# Patient Record
Sex: FEMALE | Race: WHITE | HISPANIC OR LATINO | Employment: FULL TIME | URBAN - METROPOLITAN AREA
[De-identification: names, ages, dates, MRNs, and addresses within clinical notes are randomized per-mention and may not be internally consistent; named-entity substitution may affect disease eponyms.]

---

## 2017-01-25 ENCOUNTER — EH NON-PROVIDER (OUTPATIENT)
Dept: OCCUPATIONAL MEDICINE | Facility: CLINIC | Age: 57
End: 2017-01-25

## 2017-01-25 ENCOUNTER — EMPLOYEE HEALTH (OUTPATIENT)
Dept: OCCUPATIONAL MEDICINE | Facility: CLINIC | Age: 57
End: 2017-01-25

## 2017-01-25 ENCOUNTER — HOSPITAL ENCOUNTER (OUTPATIENT)
Facility: MEDICAL CENTER | Age: 57
End: 2017-01-25
Attending: PREVENTIVE MEDICINE
Payer: COMMERCIAL

## 2017-01-25 VITALS
DIASTOLIC BLOOD PRESSURE: 90 MMHG | SYSTOLIC BLOOD PRESSURE: 142 MMHG | HEART RATE: 85 BPM | WEIGHT: 175 LBS | OXYGEN SATURATION: 94 % | BODY MASS INDEX: 32.2 KG/M2 | RESPIRATION RATE: 14 BRPM | TEMPERATURE: 98.1 F | HEIGHT: 62 IN

## 2017-01-25 DIAGNOSIS — Z02.1 ENCOUNTER FOR PRE-EMPLOYMENT HEALTH SCREENING EXAMINATION: ICD-10-CM

## 2017-01-25 DIAGNOSIS — Z02.1 PRE-EMPLOYMENT DRUG SCREENING: ICD-10-CM

## 2017-01-25 DIAGNOSIS — Z02.1 PHYSICAL EXAM, PRE-EMPLOYMENT: ICD-10-CM

## 2017-01-25 LAB
AMP AMPHETAMINE: NORMAL
BAR BARBITURATES: NORMAL
BZO BENZODIAZEPINES: NORMAL
COC COCAINE: NORMAL
HBV CORE AB SERPL QL IA: NEGATIVE
HBV SURFACE AB SER RIA-ACNC: 280.07 MIU/ML (ref 0–10)
HBV SURFACE AG SERPL QL IA: NEGATIVE
INT CON NEG: NORMAL
INT CON POS: NORMAL
MDMA ECSTASY: NORMAL
MET METHAMPHETAMINES: NORMAL
MTD METHADONE: NORMAL
OPI OPIATES: NORMAL
OXY OXYCODONE: NORMAL
PCP PHENCYCLIDINE: NORMAL
POC URINE DRUG SCREEN OCDRS: NEGATIVE
RUBV IGG SERPL IA-ACNC: 44.8 IU/ML
THC: NORMAL

## 2017-01-25 PROCEDURE — 94375 RESPIRATORY FLOW VOLUME LOOP: CPT | Performed by: PREVENTIVE MEDICINE

## 2017-01-25 PROCEDURE — 86735 MUMPS ANTIBODY: CPT | Performed by: PREVENTIVE MEDICINE

## 2017-01-25 PROCEDURE — 86762 RUBELLA ANTIBODY: CPT | Performed by: PREVENTIVE MEDICINE

## 2017-01-25 PROCEDURE — 86704 HEP B CORE ANTIBODY TOTAL: CPT | Performed by: PREVENTIVE MEDICINE

## 2017-01-25 PROCEDURE — 87340 HEPATITIS B SURFACE AG IA: CPT | Performed by: PREVENTIVE MEDICINE

## 2017-01-25 PROCEDURE — 99204 OFFICE O/P NEW MOD 45 MIN: CPT | Performed by: PREVENTIVE MEDICINE

## 2017-01-25 PROCEDURE — 80305 DRUG TEST PRSMV DIR OPT OBS: CPT | Performed by: PREVENTIVE MEDICINE

## 2017-01-25 PROCEDURE — 86765 RUBEOLA ANTIBODY: CPT | Performed by: PREVENTIVE MEDICINE

## 2017-01-25 PROCEDURE — 86787 VARICELLA-ZOSTER ANTIBODY: CPT | Performed by: PREVENTIVE MEDICINE

## 2017-01-25 PROCEDURE — 86480 TB TEST CELL IMMUN MEASURE: CPT | Performed by: PREVENTIVE MEDICINE

## 2017-01-25 PROCEDURE — 86706 HEP B SURFACE ANTIBODY: CPT | Performed by: PREVENTIVE MEDICINE

## 2017-01-25 NOTE — MR AVS SNAPSHOT
"        Tammy Ontiveros   2017 1:40 PM    Non-Provider   MRN: [id number]    Department:  [facility]   Dept Phone:  [phone]    Description:  Female : 1960   Provider:  [facility] BRENNEN MULLEN RNIK           Reason for Visit     Other [facility] physical      Allergies as of 2017     No Known Allergies      You were diagnosed with     Encounter for pre-employment health screening examination   [193519]       Pre-employment drug screening   [912392]         Vital Signs     Blood Pressure Pulse Temperature Respirations Height Weight    142/90 mmHg 85 36.7 °C (98.1 °F) 14 1.575 m (5' 2\") 79.379 kg (175 lb)    Body Mass Index Oxygen Saturation                32.00 kg/m2 94%          Basic Information     Date Of Birth Sex Race Ethnicity Preferred Language    1960 Female  or   Origin (Portuguese,Wallisian,Luxembourger,Swazi, etc) English      Health Maintenance     Patient has no pending health maintenance at this time      Results     POCT 11 Panel Urine Drug Screen      Component    AMPHETAMINE    COCAINE    POC THC    METHAMPHETAMINES    OPIATES    PHENCYCLIDINE    BENZODIAZIPINES    BARBITURATES    METHADONE    MDMA Ecstasy    OXYCODONE    Urine Drug Screen    NEGATIVE    Internal Control Positive    Valid    Internal Control Negative    Valid                        Current Immunizations     No immunizations on file.      Below and/or attached are the medications your provider expects you to take. Review all of your home medications and newly ordered medications with your provider and/or pharmacist. Follow medication instructions as directed by your provider and/or pharmacist. Please keep your medication list with you and share with your provider. Update the information when medications are discontinued, doses are changed, or new medications (including over-the-counter products) are added; and carry medication information at all times in the event of emergency situations "     Allergies:  No Known Allergies          Medications  Valid as of: January 25, 2017 -  4:30 PM    Generic Name Brand Name Tablet Size Instructions for use    .                 Medicines prescribed today were sent to:     None      Medication refill instructions:       If your prescription bottle indicates you have medication refills left, it is not necessary to call your provider’s office. Please contact your pharmacy and they will refill your medication.    If your prescription bottle indicates you do not have any refills left, you may request refills at any time through one of the following ways: The online Flatora system (except Urgent Care), by calling your provider’s office, or by asking your pharmacy to contact your provider’s office with a refill request. Medication refills are processed only during regular business hours and may not be available until the next business day. Your provider may request additional information or to have a follow-up visit with you prior to refilling your medication.   *Please Note: Medication refills are assigned a new Rx number when refilled electronically. Your pharmacy may indicate that no refills were authorized even though a new prescription for the same medication is available at the pharmacy. Please request the medicine by name with the pharmacy before contacting your provider for a refill.        Your To Do List     Future Labs/Procedures Complete By Expires    HEP B CORE AB TOTAL  As directed 1/25/2018    HEP B SURFACE AB  As directed 1/25/2018    HEP B SURFACE ANTIGEN  As directed 1/25/2018    MUMPS AB IGG  As directed 1/25/2018    QuantiFERON-TB Gold [TB TEST CELL IMM MEASURE AG]  As directed 1/25/2018    RUBELLA ABS IGG  As directed 1/25/2018    RUBEOLA AB IGM  As directed 1/25/2018    VARICELLA ZOSTER IGG AB  As directed 1/25/2018         Flatora Access Code: [email or web address]  Expires: 2/24/2017  4:30 PM    Your email address is not on file at [facility].   Email Addresses are required for you to sign up for LeadiD, please contact [phone] to verify your personal information and to provide your email address prior to attempting to register for LeadiD.    Tammy Ontiveros  [address] [place] [address]    [employer] Angell[employer] Network[employer]  A secure, online tool to manage your health information     [employer]’s LeadiD® is a secure, online tool that connects you to your personalized health information from the privacy of your home -- day or night - making it very easy for you to manage your healthcare. Once the activation process is completed, you can even access your medical information using the LeadiD deborah, which is available for free in the Apple Deborah store or Google Play store.     To learn more about LeadiD, visit [email or web address]    There are two levels of access available (as shown below):   My Chart Features  [facility] Doctor [facility]  Specialists [facility]  [facility]  Care [facility]   Email your healthcare team securely and privately 24/7 X X X    Manage appointments: schedule your next appointment; view details of past/upcoming appointments X      Request prescription refills. X      View recent personal medical records, including lab and immunizations X X X X   View health record, including health history, allergies, medications X X X X   Read reports about your outpatient visits, procedures, consult and ER notes X X X X   See your discharge summary, which is a recap of your hospital and/or ER visit that includes your diagnosis, lab results, and care plan X X  X     How to register for TeleCIS Wirelesst:  Once your e-mail address has been verified, follow the following steps to sign up for LeadiD.     1. Go to  [email or web address]  2. Click on the Sign Up Now box, which takes you to the New Member Sign Up page. You will need to provide the following information:  a. Enter your LeadiD Access Code exactly as it appears at the top of this page. (You  will not need to use this code after you’ve completed the sign-up process. If you do not sign up before the expiration date, you must request a new code.)   b. Enter your date of birth.   c. Enter your home email address.   d. Click Submit, and follow the next screen’s instructions.  3. Create a [employer] ID. This will be your [employer] login ID and cannot be changed, so think of one that is secure and easy to remember.  4. Create a [employer] password. You can change your password at any time.  5. Enter your Password Reset Question and Answer. This can be used at a later time if you forget your password.   6. Enter your e-mail address. This allows you to receive e-mail notifications when new information is available in [employer].  7. Click Sign Up. You can now view your health information.    For assistance activating your [employer] account, call [phone]

## 2017-01-25 NOTE — MR AVS SNAPSHOT
Tammy Ontiveros   2017 2:20 PM   Employee Health   MRN: [id number]    Department:  [facility]   Dept Phone:  [phone]    Description:  Female : 1960   Provider:  Iogr Toro D.O.           Reason for Visit     Other [facility] physical      Allergies as of 2017     No Known Allergies      You were diagnosed with     Physical exam, pre-employment   [865366]         Basic Information     Date Of Birth Sex Race Ethnicity Preferred Language    1960 Female  or   Origin (Macedonian,Angolan,Gambian,Cali, etc) English      Health Maintenance     Patient has no pending health maintenance at this time      Current Immunizations     No immunizations on file.      Below and/or attached are the medications your provider expects you to take. Review all of your home medications and newly ordered medications with your provider and/or pharmacist. Follow medication instructions as directed by your provider and/or pharmacist. Please keep your medication list with you and share with your provider. Update the information when medications are discontinued, doses are changed, or new medications (including over-the-counter products) are added; and carry medication information at all times in the event of emergency situations     Allergies:  No Known Allergies          Medications  Valid as of: 2017 -  4:31 PM    Generic Name Brand Name Tablet Size Instructions for use    .                 Medicines prescribed today were sent to:     None      Medication refill instructions:       If your prescription bottle indicates you have medication refills left, it is not necessary to call your provider’s office. Please contact your pharmacy and they will refill your medication.    If your prescription bottle indicates you do not have any refills left, you may request refills at any time through one of the following ways: The online Cardinal Midstream system (except Urgent Care), by  calling your provider’s office, or by asking your pharmacy to contact your provider’s office with a refill request. Medication refills are processed only during regular business hours and may not be available until the next business day. Your provider may request additional information or to have a follow-up visit with you prior to refilling your medication.   *Please Note: Medication refills are assigned a new Rx number when refilled electronically. Your pharmacy may indicate that no refills were authorized even though a new prescription for the same medication is available at the pharmacy. Please request the medicine by name with the pharmacy before contacting your provider for a refill.           PeerTrader Access Code: [email or web address]  Expires: 2/24/2017  4:30 PM    Your email address is not on file at [facility].  Email Addresses are required for you to sign up for PeerTrader, please contact [phone] to verify your personal information and to provide your email address prior to attempting to register for PeerTrader.    Tammy Ontiveros  [address] [place] [address]    PeerTrader  A secure, online tool to manage your health information     [facility]’s PeerTrader® is a secure, online tool that connects you to your personalized health information from the privacy of your home -- day or night - making it very easy for you to manage your healthcare. Once the activation process is completed, you can even access your medical information using the PeerTrader deborah, which is available for free in the Apple Deborah store or Google Play store.     To learn more about PeerTrader, visit [email or web address]    There are two levels of access available (as shown below):   My Chart Features  [facility] Primary Care Doctor [facility]  Specialists [facility]  Urgent  Care Non-[facility] Primary Care Doctor   Email your healthcare team securely and privately 24/7 X X X    Manage appointments: schedule your next appointment; view details of past/upcoming  appointments X      Request prescription refills. X      View recent personal medical records, including lab and immunizations X X X X   View health record, including health history, allergies, medications X X X X   Read reports about your outpatient visits, procedures, consult and ER notes X X X X   See your discharge summary, which is a recap of your hospital and/or ER visit that includes your diagnosis, lab results, and care plan X X  X     How to register for Syntec Biofuel:  Once your e-mail address has been verified, follow the following steps to sign up for Syntec Biofuel.     1. Go to  [email or web address]  2. Click on the Sign Up Now box, which takes you to the New Member Sign Up page. You will need to provide the following information:  a. Enter your Syntec [employer]fuel Access Code exactly as it appears at the top of this page. (You will not need to use this code after you’ve completed the sign-up process. If you do not sign up before the expiration date, you must request a new code.)   b. Enter your date of birth.   c. Enter your home email address.   d. Click Submit, and follow the next screen’s instructions.  3. Create a Syntec Biofuel ID. This will be your Syntec Biofuel login ID and cannot be changed, so think of one that is secure and easy to remember.  4. Create a Syntec Biofuel password. You can change your password at any time.  5. Enter your Password Reset Question and Answer. This can be used at a later time if you forget your password.   6. Enter your e-mail address. This allows you to receive e-mail notifications when new information is available in Syntec Biofuel.  7. Click Sign Up. You can now view your health information.    For assistance activating your Syntec Biofuel account, call [phone]

## 2017-01-27 LAB
M TB TUBERC IFN-G/MITOGEN IGNF BLD: -0.02
M TB TUBERC IGNF/MITOGEN IGNF CONTROL: 62.19 [IU]/ML
MEV IGG SER IA-ACNC: >300 AU/ML
MITOGEN IGNF BCKGRD COR BLD-ACNC: 0.14 [IU]/ML
MUV IGG SER IA-ACNC: 217 AU/ML
QUANT TB GOLD 86480: NEGATIVE
VZV IGG SER IA-ACNC: 288 IV

## 2017-07-26 ENCOUNTER — HOSPITAL ENCOUNTER (OUTPATIENT)
Dept: LAB | Facility: MEDICAL CENTER | Age: 57
End: 2017-07-26
Payer: COMMERCIAL

## 2017-07-26 LAB
BDY FAT % MEASURED: 39.8 %
BP DIAS: 69 MMHG
BP SYS: 129 MMHG
CHOLEST SERPL-MCNC: 177 MG/DL (ref 100–199)
DIABETES HTDIA: NO
EVENT NAME HTEVT: NORMAL
FASTING HTFAS: YES
GLUCOSE SERPL-MCNC: 96 MG/DL (ref 65–99)
HDLC SERPL-MCNC: 38 MG/DL
HIP CIRCUMFERENCE HTHC: ABNORMAL IN
HYPERTENSION HTHYP: NO
LDLC SERPL CALC-MCNC: 102 MG/DL
SCREENING LOC CITY HTCIT: NORMAL
SCREENING LOC STATE HTSTA: NORMAL
SCREENING LOCATION HTLOC: NORMAL
SMOKING HTSMO: NO
SUBSCRIBER ID HTSID: NORMAL
TRIGL SERPL-MCNC: 184 MG/DL (ref 0–149)
WAIST CIRCUMFERENCE HTWC: ABNORMAL IN

## 2017-07-26 PROCEDURE — 36415 COLL VENOUS BLD VENIPUNCTURE: CPT

## 2017-07-26 PROCEDURE — 82947 ASSAY GLUCOSE BLOOD QUANT: CPT

## 2017-07-26 PROCEDURE — S5190 WELLNESS ASSESSMENT BY NONPH: HCPCS

## 2017-07-26 PROCEDURE — 80061 LIPID PANEL: CPT

## 2017-08-09 ENCOUNTER — HOSPITAL ENCOUNTER (OUTPATIENT)
Dept: RADIOLOGY | Facility: MEDICAL CENTER | Age: 57
End: 2017-08-09
Attending: FAMILY MEDICINE
Payer: COMMERCIAL

## 2017-08-09 DIAGNOSIS — Z12.31 SCREENING MAMMOGRAM, ENCOUNTER FOR: ICD-10-CM

## 2017-08-09 PROCEDURE — 77063 BREAST TOMOSYNTHESIS BI: CPT

## 2017-09-11 ENCOUNTER — NON-PROVIDER VISIT (OUTPATIENT)
Dept: OCCUPATIONAL MEDICINE | Facility: CLINIC | Age: 57
End: 2017-09-11

## 2017-09-11 DIAGNOSIS — Z29.89 NEED FOR ISOLATION: ICD-10-CM

## 2017-09-11 PROCEDURE — 94375 RESPIRATORY FLOW VOLUME LOOP: CPT

## 2017-10-03 ENCOUNTER — IMMUNIZATION (OUTPATIENT)
Dept: OCCUPATIONAL MEDICINE | Facility: CLINIC | Age: 57
End: 2017-10-03

## 2017-10-03 DIAGNOSIS — Z23 NEED FOR VACCINATION: ICD-10-CM

## 2017-10-03 PROCEDURE — 90686 IIV4 VACC NO PRSV 0.5 ML IM: CPT | Performed by: PREVENTIVE MEDICINE

## 2017-10-11 ENCOUNTER — HOSPITAL ENCOUNTER (OUTPATIENT)
Dept: LAB | Facility: MEDICAL CENTER | Age: 57
End: 2017-10-11
Attending: STUDENT IN AN ORGANIZED HEALTH CARE EDUCATION/TRAINING PROGRAM
Payer: COMMERCIAL

## 2017-10-11 LAB
25(OH)D3 SERPL-MCNC: 18 NG/ML (ref 30–100)
ALBUMIN SERPL BCP-MCNC: 4 G/DL (ref 3.2–4.9)
ALBUMIN/GLOB SERPL: 1.1 G/DL
ALP SERPL-CCNC: 88 U/L (ref 30–99)
ALT SERPL-CCNC: 14 U/L (ref 2–50)
ANION GAP SERPL CALC-SCNC: 9 MMOL/L (ref 0–11.9)
AST SERPL-CCNC: 18 U/L (ref 12–45)
BILIRUB SERPL-MCNC: 0.6 MG/DL (ref 0.1–1.5)
BUN SERPL-MCNC: 13 MG/DL (ref 8–22)
CALCIUM SERPL-MCNC: 9.4 MG/DL (ref 8.5–10.5)
CHLORIDE SERPL-SCNC: 103 MMOL/L (ref 96–112)
CHOLEST SERPL-MCNC: 174 MG/DL (ref 100–199)
CO2 SERPL-SCNC: 26 MMOL/L (ref 20–33)
CREAT SERPL-MCNC: 0.58 MG/DL (ref 0.5–1.4)
GFR SERPL CREATININE-BSD FRML MDRD: >60 ML/MIN/1.73 M 2
GLOBULIN SER CALC-MCNC: 3.7 G/DL (ref 1.9–3.5)
GLUCOSE SERPL-MCNC: 86 MG/DL (ref 65–99)
HDLC SERPL-MCNC: 32 MG/DL
LDLC SERPL CALC-MCNC: 78 MG/DL
POTASSIUM SERPL-SCNC: 4.2 MMOL/L (ref 3.6–5.5)
PROT SERPL-MCNC: 7.7 G/DL (ref 6–8.2)
SODIUM SERPL-SCNC: 138 MMOL/L (ref 135–145)
TRIGL SERPL-MCNC: 318 MG/DL (ref 0–149)
TSH SERPL DL<=0.005 MIU/L-ACNC: 1.1 UIU/ML (ref 0.3–3.7)

## 2017-10-11 PROCEDURE — 83036 HEMOGLOBIN GLYCOSYLATED A1C: CPT

## 2017-10-11 PROCEDURE — 84443 ASSAY THYROID STIM HORMONE: CPT

## 2017-10-11 PROCEDURE — 80053 COMPREHEN METABOLIC PANEL: CPT

## 2017-10-11 PROCEDURE — 80061 LIPID PANEL: CPT

## 2017-10-11 PROCEDURE — 82306 VITAMIN D 25 HYDROXY: CPT

## 2017-10-11 PROCEDURE — 85025 COMPLETE CBC W/AUTO DIFF WBC: CPT

## 2017-10-11 PROCEDURE — 36415 COLL VENOUS BLD VENIPUNCTURE: CPT

## 2017-10-12 LAB
BASOPHILS # BLD AUTO: 1.6 % (ref 0–1.8)
BASOPHILS # BLD: 0.12 K/UL (ref 0–0.12)
EOSINOPHIL # BLD AUTO: 0.24 K/UL (ref 0–0.51)
EOSINOPHIL NFR BLD: 3.2 % (ref 0–6.9)
ERYTHROCYTE [DISTWIDTH] IN BLOOD BY AUTOMATED COUNT: 41.8 FL (ref 35.9–50)
EST. AVERAGE GLUCOSE BLD GHB EST-MCNC: 137 MG/DL
HBA1C MFR BLD: 6.4 % (ref 0–5.6)
HCT VFR BLD AUTO: 46.5 % (ref 37–47)
HGB BLD-MCNC: 15.5 G/DL (ref 12–16)
IMM GRANULOCYTES # BLD AUTO: 0.02 K/UL (ref 0–0.11)
IMM GRANULOCYTES NFR BLD AUTO: 0.3 % (ref 0–0.9)
LYMPHOCYTES # BLD AUTO: 2.35 K/UL (ref 1–4.8)
LYMPHOCYTES NFR BLD: 31.8 % (ref 22–41)
MCH RBC QN AUTO: 28.8 PG (ref 27–33)
MCHC RBC AUTO-ENTMCNC: 33.3 G/DL (ref 33.6–35)
MCV RBC AUTO: 86.3 FL (ref 81.4–97.8)
MONOCYTES # BLD AUTO: 0.54 K/UL (ref 0–0.85)
MONOCYTES NFR BLD AUTO: 7.3 % (ref 0–13.4)
NEUTROPHILS # BLD AUTO: 4.13 K/UL (ref 2–7.15)
NEUTROPHILS NFR BLD: 55.8 % (ref 44–72)
NRBC # BLD AUTO: 0 K/UL
NRBC BLD AUTO-RTO: 0 /100 WBC
PLATELET # BLD AUTO: 364 K/UL (ref 164–446)
PMV BLD AUTO: 10 FL (ref 9–12.9)
RBC # BLD AUTO: 5.39 M/UL (ref 4.2–5.4)
WBC # BLD AUTO: 7.4 K/UL (ref 4.8–10.8)

## 2017-12-27 ENCOUNTER — HOSPITAL ENCOUNTER (OUTPATIENT)
Dept: LAB | Facility: MEDICAL CENTER | Age: 57
End: 2017-12-27
Attending: FAMILY MEDICINE
Payer: COMMERCIAL

## 2017-12-27 LAB
25(OH)D3 SERPL-MCNC: 64 NG/ML (ref 30–100)
CHOLEST SERPL-MCNC: 192 MG/DL (ref 100–199)
EST. AVERAGE GLUCOSE BLD GHB EST-MCNC: 123 MG/DL
HBA1C MFR BLD: 5.9 % (ref 0–5.6)
HDLC SERPL-MCNC: 41 MG/DL
LDLC SERPL CALC-MCNC: 122 MG/DL
TRIGL SERPL-MCNC: 143 MG/DL (ref 0–149)

## 2017-12-27 PROCEDURE — 36415 COLL VENOUS BLD VENIPUNCTURE: CPT

## 2017-12-27 PROCEDURE — 82306 VITAMIN D 25 HYDROXY: CPT

## 2017-12-27 PROCEDURE — 80061 LIPID PANEL: CPT

## 2017-12-27 PROCEDURE — 83036 HEMOGLOBIN GLYCOSYLATED A1C: CPT

## 2018-01-30 ENCOUNTER — APPOINTMENT (OUTPATIENT)
Dept: RADIOLOGY | Facility: IMAGING CENTER | Age: 58
End: 2018-01-30
Attending: PREVENTIVE MEDICINE
Payer: COMMERCIAL

## 2018-01-30 ENCOUNTER — OCCUPATIONAL MEDICINE (OUTPATIENT)
Dept: OCCUPATIONAL MEDICINE | Facility: CLINIC | Age: 58
End: 2018-01-30
Payer: COMMERCIAL

## 2018-01-30 VITALS
SYSTOLIC BLOOD PRESSURE: 120 MMHG | RESPIRATION RATE: 16 BRPM | WEIGHT: 165 LBS | DIASTOLIC BLOOD PRESSURE: 80 MMHG | OXYGEN SATURATION: 98 % | BODY MASS INDEX: 30.36 KG/M2 | TEMPERATURE: 97 F | HEIGHT: 62 IN | HEART RATE: 70 BPM

## 2018-01-30 DIAGNOSIS — Z02.1 PRE-EMPLOYMENT DRUG SCREENING: ICD-10-CM

## 2018-01-30 DIAGNOSIS — S80.01XA CONTUSION OF RIGHT KNEE, INITIAL ENCOUNTER: ICD-10-CM

## 2018-01-30 DIAGNOSIS — S60.229A CONTUSION OF HAND, UNSPECIFIED LATERALITY, INITIAL ENCOUNTER: ICD-10-CM

## 2018-01-30 DIAGNOSIS — S62.656A: ICD-10-CM

## 2018-01-30 DIAGNOSIS — S80.211A ABRASION OF RIGHT KNEE, INITIAL ENCOUNTER: Primary | ICD-10-CM

## 2018-01-30 DIAGNOSIS — S80.211A ABRASION OF RIGHT KNEE, INITIAL ENCOUNTER: ICD-10-CM

## 2018-01-30 DIAGNOSIS — S62.654A: ICD-10-CM

## 2018-01-30 PROCEDURE — 73562 X-RAY EXAM OF KNEE 3: CPT | Mod: TC,RT | Performed by: EMERGENCY MEDICINE

## 2018-01-30 PROCEDURE — 73130 X-RAY EXAM OF HAND: CPT | Mod: TC,LT | Performed by: EMERGENCY MEDICINE

## 2018-01-30 PROCEDURE — 73130 X-RAY EXAM OF HAND: CPT | Mod: 26,RT | Performed by: EMERGENCY MEDICINE

## 2018-01-30 PROCEDURE — 73110 X-RAY EXAM OF WRIST: CPT | Mod: TC,LT | Performed by: EMERGENCY MEDICINE

## 2018-01-30 PROCEDURE — 99203 OFFICE O/P NEW LOW 30 MIN: CPT | Performed by: PREVENTIVE MEDICINE

## 2018-01-30 RX ORDER — IBUPROFEN 200 MG
400 TABLET ORAL ONCE
OUTPATIENT
Start: 2018-01-30 | End: 2018-01-31

## 2018-01-30 RX ORDER — DICLOFENAC SODIUM 75 MG/1
75 TABLET, DELAYED RELEASE ORAL 2 TIMES DAILY
Qty: 60 TAB | Refills: 1 | Status: SHIPPED | OUTPATIENT
Start: 2018-01-30

## 2018-01-30 RX ORDER — ACETAMINOPHEN 500 MG
1000 TABLET ORAL ONCE
OUTPATIENT
Start: 2018-01-30 | End: 2018-01-31

## 2018-01-30 ASSESSMENT — PAIN SCALES - GENERAL: PAINLEVEL: 3=SLIGHT PAIN

## 2018-01-30 NOTE — LETTER
"EMPLOYEE’S CLAIM FOR COMPENSATION/ REPORT OF INITIAL TREATMENT  FORM C-4    EMPLOYEE’S CLAIM - PROVIDE ALL INFORMATION REQUESTED   First Name  Tammy Last Name  Weston Birthdate                    1960                Sex  female Claim Number   Home Address  [address] Age  57 y.o. 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 (if applicable)  walking    How did this injury or occupational disease occur? (Be specific an answer in detail. Use additional sheet if necessary)  I fell down as I was walking towads the entrance of [facility]. I somehow I fell and scraped my knee and hands   If you believe that you have an occupational disease, when did you first have knowledge of the disability and it relationship to your employment?  n/a Witnesses to the Accident  Yes. I'm not sure the name      Nature of Injury or Occupational Disease  Contusion  Part(s) of Body Injured or Affected  Knee (R), Hand (L), Hand (R)    I certify that the above is true and correct to the best of my knowledge and that I have provided this information in order to obtain the benefits of " [place]).  I hereby authorize any physician, chiropractor, surgeon, practitioner, or other person, any hospital, including [facility] or [facility], any medical service organization, any insurance company, or other institution or organization to release to each other, any medical or other information, including benefits paid or payable, pertinent to this injury or disease, except information relative to diagnosis, treatment and/or counseling for AIDS, psychological conditions, alcohol or controlled substances, for which I must give specific authorization.  A Photostat of this authorization shall be as valid as the original.     Date   Place   Employee’s Signature   THIS REPORT MUST BE COMPLETED AND MAILED WITHIN 3 WORKING DAYS OF TREATMENT   Place  [facility]  [facility]   Date  1/30/2018 Diagnosis  (S80.211A) Abrasion of right knee, initial encounter  (S80.01XA) Contusion of right knee, initial encounter  (S60.229A) Contusion of hand, unspecified laterality, initial encounter Is there evidence the injured employee was under the influence of alcohol and/or another controlled substance at the time of accident?   Hour  9:15 AM Description of Injury or Disease  Diagnoses of Abrasion of right knee, initial encounter, Contusion of right knee, initial encounter, and Contusion of hand, unspecified laterality, initial encounter were pertinent to this visit. No   Treatment  Right knee abrasion, right hand contusion-first aid only  Have you advised the patient to remain off work five days or more? No   X-Ray Findings      If Yes   From Date  To Date      From information given by the employee, together with medical evidence, can you directly connect this injury or occupational disease as job incurred?  Yes If No Full Duty  Yes Modified Duty      Is additional  "medical care by a physician indicated?  Yes If Modified Duty, Specify any Limitations / Restrictions      Do you know of any previous injury or disease contributing to this condition or occupational disease?                            No   Date  1/30/2018 Print Doctor’s Name Herbert Calero M.D. I certify the employer’s copy of  this form was mailed on:   Address  [address] [place] [address],   [address] Insurer’s Use Only     [facility]  [address]    Provider’s Tax ID Number  [id number] Telephone  Dept: [phone]        e-HERBERT Ibanez M.D.   e-Signature: Dr. Rob Akbar, Medical Director Degree  MD        ORIGINAL-TREATING PHYSICIAN OR CHIROPRACTOR    PAGE 2-INSURER/TPA    PAGE 3-EMPLOYER    PAGE 4-EMPLOYEE             Form C-4 (rev10/07)              BRIEF DESCRIPTION OF RIGHTS AND BENEFITS  (Pursuant to [place]RS 616C.050)    Notice of Injury or Occupational Disease (Incident Report Form C-1): If an injury or occupational disease (OD) arises out of and in the  course of employment, you must provide written notice to your employer as soon as practicable, but no later than 7 days after the accident or  OD. Your employer shall maintain a sufficient supply of the required forms.    Claim for Compensation (Form C-4): If medical treatment is sought, the form C-4 is available at the place of initial treatment. A completed  \"Claim for Compensation\" (Form C-4) must be filed within 90 days after an accident or OD. The treating physician or chiropractor must,  within 3 working days after treatment, complete and mail to the employer, the employer's insurer and third-party , the Claim for  Compensation.    Medical Treatment: If you require medical treatment for your on-the-job injury or OD, you may be required to select a physician or  chiropractor from a list provided by your workers’ compensation insurer, if it has contracted with an Organization for Managed Care (MCO) or  Preferred Provider " Organization (PPO) or providers of health care. If your employer has not entered into a contract with an MCO or PPO, you  may select a physician or chiropractor from the Panel of Physicians and Chiropractors. Any medical costs related to your industrial injury or  OD will be paid by your insurer.    Temporary Total Disability (TTD): If your doctor has certified that you are unable to work for a period of at least 5 consecutive days, or 5  cumulative days in a 20-day period, or places restrictions on you that your employer does not accommodate, you may be entitled to TTD  compensation.    Temporary Partial Disability (TPD): If the wage you receive upon reemployment is less than the compensation for TTD to which you are  entitled, the insurer may be required to pay you TPD compensation to make up the difference. TPD can only be paid for a maximum of 24  months.    Permanent Partial Disability (PPD): When your medical condition is stable and there is an indication of a PPD as a result of your injury or  OD, within 30 days, your insurer must arrange for an evaluation by a rating physician or chiropractor to determine the degree of your PPD. The  amount of your PPD award depends on the date of injury, the results of the PPD evaluation and your age and wage.    Permanent Total Disability (PTD): If you are medically certified by a treating physician or chiropractor as permanently and totally disabled  and have been granted a PTD status by your insurer, you are entitled to receive monthly benefits not to exceed 66 2/3% of your average  monthly wage. The amount of your PTD payments is subject to reduction if you previously received a PPD award.    Vocational Rehabilitation Services: You may be eligible for vocational rehabilitation services if you are unable to return to the job due to a  permanent physical impairment or permanent restrictions as a result of your injury or occupational disease.    Transportation and Per Daisy  Reimbursement: You may be eligible for travel expenses and per britany associated with medical treatment.    Reopening: You may be able to reopen your claim if your condition worsens after claim closure.    Appeal Process: If you disagree with a written determination issued by the insurer or the insurer does not respond to your request, you may  appeal to the Department of Administration, , by following the instructions contained in your determination letter. You must  appeal the determination within 70 days from the date of the determination letter at [address], [address], [place], [place]  [address], or [address], [address], [place], [place] [address]. If you disagree with the  decision, you may appeal to the  Department of Administration, . You must file your appeal within 30 days from the date of the  decision  letter at [address], [address], [place], [place] [address], or [address], [facility] [address], [place], [place] [address]. If you  disagree with a decision of an , you may file a petition for judicial review with the District Court. You must do so within 30  days of the Appeal Officer’s decision. You may be represented by an  at your own expense or you may contact the [facility] for possible  representation.    [place]  for Injured Workers (NAIW): If you disagree with a  decision, you may request that NAIW represent you  without charge at an  Hearing. For information regarding denial of benefits, you may contact the [facility] at: [address], [address], [place] [address], [phone], or [address], [facility] [address], [place] [address], [phone]    To File a Complaint with the Division: If you wish to file a complaint with the  of the Division of Industrial Relations (DIR),  please contact the Workers’ Compensation Section, [address], [address], [place], [place] [address], telephone ([phone]) [phone], or  [address], [address], [place], [place] [address], telephone [phone].    For assistance with Workers’ Compensation Issues: you may contact the Office of the [facility] Consumer Health Assistance, [address] [place] [address], [address], [place], [place] [address], Toll Free 1-700.539.5785, Web site: http://govcha.[facility].nv., E-mail  [email or web address].                                                                                                                                                                                                                                   __________________________________________________________________                                                                   _________________                Employee Name / Signature                                                                                                                                                       Date                                                                                                                                                                                                     D-2 (rev. 10/07)

## 2018-01-30 NOTE — LETTER
"EMPLOYEE’S CLAIM FOR COMPENSATION/ REPORT OF INITIAL TREATMENT  FORM C-4    EMPLOYEE’S CLAIM - PROVIDE ALL INFORMATION REQUESTED   First Name  Tammy Last Name  Weston Birthdate                    1960                Sex  female Claim Number   Home Address  [address] Age  57 y.o. Height  1.575 m (5' 2\") Weight  74.8 kg (165 lb) [facility]     [facility] [address] Zip  [address] Telephone  [phone] (home)    Mailing Address  [address] [facility] [address] Zip  [address] Primary Language Spoken  English    Insurer   Third Party   Workers Choice   Employee's Occupation (Job Title) When Injury or Occupational Disease Occurred  [employer]    Employer's Name  [employer]  Telephone  [phone]    Employer Address  [address]  Zip  [address]    Date of Injury  1/30/2018               Hour of Injury  5:55 PM Date Employer Notified  1/30/2018 Last Day of Work after Injury or Occupational Disease  1/30/2018 Supervisor to Whom Injury Reported  Drake Steve   Address or Location of Accident (if applicable)  [facility]   What were you doing at the time of accident? (if applicable)  walking    How did this injury or occupational disease occur? (Be specific an answer in detail. Use additional sheet if necessary)  I fell down as I was walking towads the entrance of [facility]. I somehow I fell and scraped my knee and hands   If you believe that you have an occupational disease, when did you first have knowledge of the disability and it relationship to your employment?  n/a Witnesses to the Accident  Yes. I'm not sure the name      Nature of Injury or Occupational Disease  Contusion  Part(s) of Body Injured or Affected  Knee (R), Hand (L), Hand (R)    I certify that the above is true and correct to the best of my knowledge and that I have provided this information in order to obtain the benefits of [place]’s " [place]  I hereby authorize any physician, chiropractor, surgeon, practitioner, or other person, any hospital, including [facility] or [facility], any medical service organization, any insurance company, or other institution or organization to release to each other, any medical or other information, including benefits paid or payable, pertinent to this injury or disease, except information relative to diagnosis, treatment and/or counseling for AIDS, psychological conditions, alcohol or controlled substances, for which I must give specific authorization.  A Photostat of this authorization shall be as valid as the original.     Date   Place   Employee’s Signature   THIS REPORT MUST BE COMPLETED AND MAILED WITHIN 3 WORKING DAYS OF TREATMENT   Place  [facility]  Name of [facility]   Date  1/30/2018 Diagnosis  (S80.211A) Abrasion of right knee, initial encounter  (primary encounter diagnosis)  (S80.01XA) Contusion of right knee, initial encounter  (S60.229A) Contusion of hand, unspecified laterality, initial encounter  (Z02.1) Pre-employment drug screening  (S62.656A) Nondisplaced fracture of medial phalanx of right little finger, initial encounter for closed fracture  (S62.654A) Nondisplaced fracture of medial phalanx of right ring finger, initial encounter for closed fracture Is there evidence the injured employee was under the influence of alcohol and/or another controlled substance at the time of accident?   Hour  9:15 AM Description of Injury or Disease  The primary encounter diagnosis was Abrasion of right knee, initial encounter. Diagnoses of Contusion of right knee, initial encounter, Contusion of hand, unspecified laterality, initial encounter, Pre-employment drug screening, Nondisplaced fracture of medial phalanx of right little finger, initial encounter for  closed fracture, and Nondisplaced fracture of medial phalanx of right ring finger, initial encounter for closed fracture were also pertinent to this visit. No   Treatment  Right knee abrasion, right hand fracture middle phalanx fourth and fifth digits, left hand contusion  Have you advised the patient to remain off work five days or more? No   X-Ray Findings  Positive   If Yes   From Date  To Date      From information given by the employee, together with medical evidence, can you directly connect this injury or occupational disease as job incurred?  Yes If No Full Duty  No Modified Duty  Yes   Is additional medical care by a physician indicated?  Yes If Modified Duty, Specify any Limitations / Restrictions  Limited lifting, limited use of right hand   Do you know of any previous injury or disease contributing to this condition or occupational disease?                            No   Date  1/30/2018 Print Doctor’s Name Herbert Calero M.D. I certify the employer’s copy of  this form was mailed on:   Address  [address] [place] [address],   [address] Insurer’s Use Only     [facility]  [address]    Provider’s Tax ID Number  [id number] Telephone  Dept: [phone]        e-HERBERT Ibanez M.D.   e-Signature: Dr. Rob Akbar, Medical Director Degree  MD        ORIGINAL-TREATING PHYSICIAN OR CHIROPRACTOR    PAGE 2-INSURER/TPA    PAGE 3-EMPLOYER    PAGE 4-EMPLOYEE             Form C-4 (rev10/07)              BRIEF DESCRIPTION OF RIGHTS AND BENEFITS  (Pursuant to [place]RS 616C.050)    Notice of Injury or Occupational Disease (Incident Report Form C-1): If an injury or occupational disease (OD) arises out of and in the  course of employment, you must provide written notice to your employer as soon as practicable, but no later than 7 days after the accident or  OD. Your employer shall maintain a sufficient supply of the required forms.    Claim for Compensation (Form C-4): If medical treatment is sought, the form  "C-4 is available at the place of initial treatment. A completed  \"Claim for Compensation\" (Form C-4) must be filed within 90 days after an accident or OD. The treating physician or chiropractor must,  within 3 working days after treatment, complete and mail to the employer, the employer's insurer and third-party , the Claim for  Compensation.    Medical Treatment: If you require medical treatment for your on-the-job injury or OD, you may be required to select a physician or  chiropractor from a list provided by your workers’ compensation insurer, if it has contracted with an Organization for Managed Care (MCO) or  Preferred Provider Organization (PPO) or providers of health care. If your employer has not entered into a contract with an MCO or PPO, you  may select a physician or chiropractor from the Panel of Physicians and Chiropractors. Any medical costs related to your industrial injury or  OD will be paid by your insurer.    Temporary Total Disability (TTD): If your doctor has certified that you are unable to work for a period of at least 5 consecutive days, or 5  cumulative days in a 20-day period, or places restrictions on you that your employer does not accommodate, you may be entitled to TTD  compensation.    Temporary Partial Disability (TPD): If the wage you receive upon reemployment is less than the compensation for TTD to which you are  entitled, the insurer may be required to pay you TPD compensation to make up the difference. TPD can only be paid for a maximum of 24  months.    Permanent Partial Disability (PPD): When your medical condition is stable and there is an indication of a PPD as a result of your injury or  OD, within 30 days, your insurer must arrange for an evaluation by a rating physician or chiropractor to determine the degree of your PPD. The  amount of your PPD award depends on the date of injury, the results of the PPD evaluation and your age and wage.    Permanent Total " Disability (PTD): If you are medically certified by a treating physician or chiropractor as permanently and totally disabled  and have been granted a PTD status by your insurer, you are entitled to receive monthly benefits not to exceed 66 2/3% of your average  monthly wage. The amount of your PTD payments is subject to reduction if you previously received a PPD award.    Vocational Rehabilitation Services: You may be eligible for vocational rehabilitation services if you are unable to return to the job due to a  permanent physical impairment or permanent restrictions as a result of your injury or occupational disease.    Transportation and Per Britany Reimbursement: You may be eligible for travel expenses and per britany associated with medical treatment.    Reopening: You may be able to reopen your claim if your condition worsens after claim closure.    Appeal Process: If you disagree with a written determination issued by the insurer or the insurer does not respond to your request, you may  appeal to the Department of Administration, , by following the instructions contained in your determination letter. You must  appeal the determination within 70 days from the date of the determination letter at [address], [address], [place]  [address], or [address] [facility] [address], [place] [address]. If you disagree with the  decision, you may appeal to the  Department of Administration, . You must file your appeal within 30 days from the date of the  decision  letter at [address], [address], [place] [address], or [address], [place] [address]. If you  disagree with a decision of an , you may file a petition for judicial review with the District Court. You must do so within 30  days of the Appeal Officer’s decision. You may be represented by an  at your own expense or you  may contact the [facility] for possible  representation.    [place]  for Injured Workers (NAIW): If you disagree with a  decision, you may request that NAIW represent you  without charge at an  Hearing. For information regarding denial of benefits, you may contact the NA at: [address], [address], [place] [address], [phone], or [address], [place] [address], [phone]    To File a Complaint with the Division: If you wish to file a complaint with the  of the Division of Industrial Relations (DIR),  please contact the Workers’ Compensation Section, [address], [address], [place], [place] [address], telephone [phone]) [phone], or  [address], [address], [place] [address], telephone (917) [phone].    For assistance with Workers’ Compensation Issues: you may contact the Office of the Governor Consumer Health Assistance, [address] [place] [address], [address], [place], [place] [address], Toll Free 1-445.385.7223, Web site: http://govcha.[facility].nv.us, E-mail  Margy@[facility].nv.                                                                                                                                                                                                                                   __________________________________________________________________                                                                   _________________                Employee Name / Signature                                                                                                                                                       Date                                                                                                                                                                                                     D-2 (rev. 10/07)

## 2018-01-30 NOTE — LETTER
"   [address],   [address] [place] [address]  Phone:  [phone] - Fax:  [phone]   Occupational Health [facility] Progress Report and Disability Certification  Date of Service: 1/30/2018   No Show:  No  Date / Time of Next Visit: 2/1/2018 @ 4:20 PM    Claim Information   Patient Name: Tammy Ontiveros  Claim Number:     Employer: [employer]  Date of Injury: 1/30/2018     Insurer / TPA: Workers Choice  ID / SSN:     Occupation: [employer]  Diagnosis: The primary encounter diagnosis was Abrasion of right knee, initial encounter. Diagnoses of Contusion of right knee, initial encounter, Contusion of hand, unspecified laterality, initial encounter, Pre-employment drug screening, Nondisplaced fracture of medial phalanx of right little finger, initial encounter for closed fracture, and Nondisplaced fracture of medial phalanx of right ring finger, initial encounter for closed fracture were also pertinent to this visit.    Medical Information   Related to Industrial Injury?   Comments:pending determination by insurance    Subjective Complaints:  Date of injury 1/30/28. Mechanism of injury-\"I fell down as I was walking towads the entrance of [employer]. I somehow I fell and scraped my knee and hands.\" 57-year-old worker seen for evaluation of right knee injuries and bilateral hand injuries. She fell without known hazard as she was entering her place of employment. Actually, she has minimal pain complaints. She has some difficulty moving the left and noticed some bruising on the palmar right hand. No head injury.   Objective Findings: Appearance: Well-developed, well-nourished.   Mental Status: Mood and Affect normal. Pleasant. Cooperative. Appropriate.   ENT: Oropharynx clear. Moist mucous membranes. Hearing normal.   Eyes: Pupils reactive. Conjunctiva normal. No scleral icterus.   Neck: Trachea Midline. No thyromegaly. No masses.  Cardiovascular: Normal rate. Regular rhythm. " Normal heart sounds.   Chest: Effort normal. Breath sounds clear.   Skin: Skin is warm and dry. No rash.   Musculoskeletal: Right hand shows some ecchymosis at the base of the fourth and fifth fingers. Good  strength. Left hand shows no ecchymosis. Some difficulty flexing thumb. Right knee shows abrasion 10 cm in greatest dimension. No bony tenderness.     Pre-Existing Condition(s):     Assessment:   Initial Visit    Status: Additional Care Required  Permanent Disability:No    Plan:      Diagnostics:      Comments:       Disability Information   Status: Released to Restricted Duty    From:  1/30/2018  Through: 2/1/2018 Restrictions are:     Physical Restrictions   Sitting:    Standing:    Stooping:    Bending:      Squatting:    Walking:    Climbing:    Pushing:  < or = to 1 hr/day   Pulling:  < or = to 1 hr/day Other:    Reaching Above Shoulder (L):   Reaching Above Shoulder (R):       Reaching Below Shoulder (L):    Reaching Below Shoulder (R):      Not to exceed Weight Limits   Carrying(hrs):   Weight Limit(lb):   Lifting(hrs):   Weight  Limit(lb): < or = to 10 pounds   Comments: Limited use of right hand    Repetitive Actions   Hands: i.e. Fine Manipulations from Grasping:     Feet: i.e. Operating Foot Controls:     Driving / Operate Machinery:     Physician Name: Herbert Calero M.D. Physician Signature: HERBERT Schneider M.D. e-Signature: Dr. Rob Akbar, Medical Director   Clinic Name / Location: [address],   [address] [place] [address] Clinic Phone Number: Dept: [phone]   Appointment Time: 9:40 Am Visit Start Time: 9:15 AM   Check-In Time:  9:05 Am Visit Discharge Time: 3:00 PM    Original-Treating Physician or Chiropractor    Page 2-Insurer/TPA    Page 3-Employer    Page 4-Employee

## 2018-01-30 NOTE — LETTER
"   [address],   [address] [place] [address]  Phone:  [phone] - Fax:  [phone]   Occupational Health [facility] Progress Report and Disability Certification  Date of Service: 1/30/2018   No Show:  No  Date / Time of Next Visit: 2/1/2018  @ 4:20 PM    Claim Information   Patient Name: Tammy Ontiveros  Claim Number:     Employer: [employer]  Date of Injury: 1/30/2018     Insurer / TPA: Workers Choice  ID / SSN:     Occupation: [employer]  Diagnosis: Diagnoses of Abrasion of right knee, initial encounter, Contusion of right knee, initial encounter, and Contusion of hand, unspecified laterality, initial encounter were pertinent to this visit.    Medical Information   Related to Industrial Injury?   Comments:pending determination by insurance    Subjective Complaints:  Date of injury 1/30/28. Mechanism of injury-\"I fell down as I was walking towads the entrance of [facility]. I somehow I fell and scraped my knee and hands.\" 57-year-old worker seen for evaluation of right knee injuries and bilateral hand injuries. She fell without known hazard as she was entering her place of employment. Actually, she has minimal pain complaints. She has some difficulty moving the left and noticed some bruising on the palmar right hand. No head injury.   Objective Findings: Appearance: Well-developed, well-nourished.   Mental Status: Mood and Affect normal. Pleasant. Cooperative. Appropriate.   ENT: Oropharynx clear. Moist mucous membranes. Hearing normal.   Eyes: Pupils reactive. Conjunctiva normal. No scleral icterus.   Neck: Trachea Midline. No thyromegaly. No masses.  Cardiovascular: Normal rate. Regular rhythm. Normal heart sounds.   Chest: Effort normal. Breath sounds clear.   Skin: Skin is warm and dry. No rash.   Musculoskeletal: Right hand shows some ecchymosis at the base of the fourth and fifth fingers. Good  strength. Left hand shows no ecchymosis. Some difficulty flexing " thumb. Right knee shows abrasion 10 cm in greatest dimension. No bony tenderness.     Pre-Existing Condition(s):     Assessment:   Initial Visit    Status: Additional Care Required  Permanent Disability:No    Plan:      Diagnostics:      Comments:       Disability Information   Status: Released to Full Duty    From:  1/30/2018  Through: 2/1/2018 Restrictions are:     Physical Restrictions   Sitting:    Standing:    Stooping:    Bending:      Squatting:    Walking:    Climbing:    Pushing:      Pulling:    Other:    Reaching Above Shoulder (L):   Reaching Above Shoulder (R):       Reaching Below Shoulder (L):    Reaching Below Shoulder (R):      Not to exceed Weight Limits   Carrying(hrs):   Weight Limit(lb):   Lifting(hrs):   Weight  Limit(lb):     Comments:      Repetitive Actions   Hands: i.e. Fine Manipulations from Grasping:     Feet: i.e. Operating Foot Controls:     Driving / Operate Machinery:     Physician Name: Herbert Calero M.D. Physician Signature: HERBERT Schneider M.D. e-Signature: Dr. Rob Akbar, Medical Director   Clinic Name / Location: [address],   [address] [address] Clinic Phone Number: Dept: [phone]   Appointment Time: 9:40 Am Visit Start Time: 9:15 AM   Check-In Time:  9:05 Am Visit Discharge Time:  9:45 AM    Original-Treating Physician or Chiropractor    Page 2-Insurer/TPA    Page 3-Employer    Page 4-Employee

## 2018-01-30 NOTE — PROGRESS NOTES
"Subjective:      Tammy Ontiveros is a 57 y.o. female who presents with Follow-Up ( DOI 01/30/2018 - R/L Palms - R/L Knee - ROOM 2)      Date of injury 1/30/28. Mechanism of injury-\"I fell down as I was walking towads the entrance of [employer]. I somehow I fell and scraped my knee and hands.\" 57-year-old worker seen for evaluation of right knee injuries and bilateral hand injuries. She fell without known hazard as she was entering her place of employment. Actually, she has minimal pain complaints. She has some difficulty moving the left and noticed some bruising on the palmar right hand. No head injury.     HPI    ROS  Comprehensive medical history form reviewed. Pertinent positives and negatives included in HPI.    PFSH: reviewed in Epic    PMH:  has no past medical history on file.  MEDS: No current outpatient prescriptions on file.  ALLERGIES: No Known Allergies  SURGHX: History reviewed. No pertinent surgical history.  SOCHX:    Work Status: Environmental services for [employer]  FH: No pertinent hereditary disorders.        Objective:     /80   Pulse 70   Temp 36.1 °C (97 °F)   Resp 16   Ht 1.575 m (5' 2\")   Wt 74.8 kg (165 lb)   SpO2 98%   BMI 30.18 kg/m²      Physical Exam    Appearance: Well-developed, well-nourished.   Mental Status: Mood and Affect normal. Pleasant. Cooperative. Appropriate.   ENT: Oropharynx clear. Moist mucous membranes. Hearing normal.   Eyes: Pupils reactive. Conjunctiva normal. No scleral icterus.   Neck: Trachea Midline. No thyromegaly. No masses.  Cardiovascular: Normal rate. Regular rhythm. Normal heart sounds.   Chest: Effort normal. Breath sounds clear.   Skin: Skin is warm and dry. No rash.   Musculoskeletal: Right hand shows some ecchymosis at the base of the fourth and fifth fingers. Good  strength. Left hand shows no ecchymosis. Some difficulty flexing thumb. Right knee shows abrasion 10 cm in greatest dimension. No bony tenderness.         Assessment/Plan:     1. " Abrasion of right knee, initial encounter  2. Contusion of right knee, initial encounter  3. Contusion of hand, unspecified laterality, initial encounter  New to Occupational Health  First aid  Regular work  Recheck one week or sooner if needed

## 2018-01-30 NOTE — LETTER
"   [address],   [address] [place] [address]  Phone:  [phone] - Fax:  [phone]   Occupational Health [facility] Progress Report and Disability Certification  Date of Service: 1/30/2018   No Show:  No  Date / Time of Next Visit: 2/1/2018   Claim Information   Patient Name: Tammy Ontiveros  Claim Number:     Employer: [employer] Date of Injury: 1/30/2018     Insurer / TPA: Workers Choice  ID / SSN:     Occupation: [employer]  Diagnosis: The primary encounter diagnosis was Abrasion of right knee, initial encounter. Diagnoses of Contusion of right knee, initial encounter, Contusion of hand, unspecified laterality, initial encounter, and Pre-employment drug screening were also pertinent to this visit.    Medical Information   Related to Industrial Injury?   Comments:pending determination by insurance    Subjective Complaints:  Date of injury 1/30/28. Mechanism of injury-\"I fell down as I was walking towads the entrance of [employer]. I somehow I fell and scraped my knee and hands.\" 57-year-old worker seen for evaluation of right knee injuries and bilateral hand injuries. She fell without known hazard as she was entering her place of employment. Actually, she has minimal pain complaints. She has some difficulty moving the left and noticed some bruising on the palmar right hand. No head injury.   Objective Findings: Appearance: Well-developed, well-nourished.   Mental Status: Mood and Affect normal. Pleasant. Cooperative. Appropriate.   ENT: Oropharynx clear. Moist mucous membranes. Hearing normal.   Eyes: Pupils reactive. Conjunctiva normal. No scleral icterus.   Neck: Trachea Midline. No thyromegaly. No masses.  Cardiovascular: Normal rate. Regular rhythm. Normal heart sounds.   Chest: Effort normal. Breath sounds clear.   Skin: Skin is warm and dry. No rash.   Musculoskeletal: Right hand shows some ecchymosis at the base of the fourth and fifth fingers. Good  " strength. Left hand shows no ecchymosis. Some difficulty flexing thumb. Right knee shows abrasion 10 cm in greatest dimension. No bony tenderness.     Pre-Existing Condition(s):     Assessment:   Initial Visit    Status: Additional Care Required  Permanent Disability:No    Plan:      Diagnostics:      Comments:       Disability Information   Status: Released to Restricted Duty    From:  1/30/2018  Through: 2/1/2018 Restrictions are:     Physical Restrictions   Sitting:    Standing:    Stooping:    Bending:      Squatting:    Walking:    Climbing:    Pushing:  < or = to 1 hr/day   Pulling:  < or = to 1 hr/day Other:    Reaching Above Shoulder (L):   Reaching Above Shoulder (R):       Reaching Below Shoulder (L):    Reaching Below Shoulder (R):      Not to exceed Weight Limits   Carrying(hrs):   Weight Limit(lb):   Lifting(hrs):   Weight  Limit(lb): < or = to 10 pounds   Comments:      Repetitive Actions   Hands: i.e. Fine Manipulations from Grasping:     Feet: i.e. Operating Foot Controls:     Driving / Operate Machinery:     Physician Name: Herbert Calero M.D. Physician Signature: HERBERT Schneider M.D. e-Signature: Dr. Rob Akbar, Medical Director   Clinic Name / Location: [address],   [address] [place] [address] Clinic Phone Number: Dept: [phone]   Appointment Time: 9:40 Am Visit Start Time: 9:15 AM   Check-In Time:  9:05 Am Visit Discharge Time: 10:15 Am    Original-Treating Physician or Chiropractor    Page 2-Insurer/TPA    Page 3-Employer    Page 4-Employee

## 2018-02-01 ENCOUNTER — OCCUPATIONAL MEDICINE (OUTPATIENT)
Dept: OCCUPATIONAL MEDICINE | Facility: CLINIC | Age: 58
End: 2018-02-01
Payer: COMMERCIAL

## 2018-02-01 VITALS
TEMPERATURE: 97.6 F | WEIGHT: 165 LBS | HEIGHT: 62 IN | RESPIRATION RATE: 16 BRPM | BODY MASS INDEX: 30.36 KG/M2 | HEART RATE: 79 BPM | OXYGEN SATURATION: 94 %

## 2018-02-01 DIAGNOSIS — S60.229A CONTUSION OF HAND, UNSPECIFIED LATERALITY, INITIAL ENCOUNTER: ICD-10-CM

## 2018-02-01 DIAGNOSIS — S62.656A: ICD-10-CM

## 2018-02-01 DIAGNOSIS — S80.01XA CONTUSION OF RIGHT KNEE, INITIAL ENCOUNTER: ICD-10-CM

## 2018-02-01 DIAGNOSIS — S62.654A: ICD-10-CM

## 2018-02-01 PROCEDURE — 99213 OFFICE O/P EST LOW 20 MIN: CPT | Performed by: PREVENTIVE MEDICINE

## 2018-02-01 ASSESSMENT — PAIN SCALES - GENERAL: PAINLEVEL: 8=MODERATE-SEVERE PAIN

## 2018-02-01 NOTE — LETTER
"   [facility]  [address] [place] [address],   [address] [address]  Phone:  [phone] - Fax:  [phone]   [facility] Progress Report and Disability Certification  Date of Service: 2/1/2018   No Show:  No  Date / Time of Next Visit: 2/8/2018 @ 10:30 AM    Claim Information   Patient Name: Tammy Ontiveros  Claim Number:     Employer: [employer]  Date of Injury: 1/30/2018     Insurer / TPA: Workers [employer]  ID / SSN:     Occupation: [employer]  Diagnosis: Diagnoses of Nondisplaced fracture of medial phalanx of right little finger, initial encounter for closed fracture, Nondisplaced fracture of medial phalanx of right ring finger, initial encounter for closed fracture, Contusion of right knee, initial encounter, and Contusion of hand, unspecified laterality, initial encounter were pertinent to this visit.    Medical Information   Related to Industrial Injury?   Comments:awaiting final determination from insurance    Subjective Complaints:  Date of injury 1/30/28. Mechanism of injury-\"I fell down as I was walking towads the entrance of [facility]. 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 She continues to have moderate pain but says that the medication does help. She is concerned about prolonged recovery due to age. She has no complaints of further knee issues.   Objective Findings: Right hand shows moderate swelling over the dorsum. Ecchymosis remains present on the volar surface of the ring and little fingers. Distal neurovascular is intact.   Pre-Existing Condition(s):     Assessment:   Condition Same    Status: Additional Care Required  Permanent Disability:No    Plan: Consultation    Diagnostics:      Comments:  Hand surgery consultation requested    Disability Information   Status: Released to Restricted Duty    From:  2/1/2018  Through: 2/8/2018 Restrictions are: Temporary   "   Physical Restrictions   Sitting:    Standing:    Stooping:    Bending:      Squatting:    Walking:    Climbing:    Pushing:      Pulling:    Other:    Reaching Above Shoulder (L):   Reaching Above Shoulder (R):       Reaching Below Shoulder (L):    Reaching Below Shoulder (R):      Not to exceed Weight Limits   Carrying(hrs):   Weight Limit(lb):   Lifting(hrs):   Weight  Limit(lb): < or = to 10 pounds   Comments: No use of right arm. Sling as needed for pain relief.    Repetitive Actions   Hands: i.e. Fine Manipulations from Grasping:     Feet: i.e. Operating Foot Controls:     Driving / Operate Machinery:     Physician Name: Herbert Calero M.D. Physician Signature: HERBERT Schneider M.D. e-Signature: Dr. Rob Akbar, Medical Director   Clinic Name / Location: [address],   [address] [place] [address] Clinic Phone Number: Dept: [phone]   Appointment Time: 4:20 Pm Visit Start Time: 3:57 PM   Check-In Time:  3:51 Pm Visit Discharge Time:  5:01 PM    Original-Treating Physician or Chiropractor    Page 2-Insurer/TPA    Page 3-Employer    Page 4-Employee

## 2018-02-02 NOTE — PROGRESS NOTES
"Subjective:      Tammy Ontiveros is a 57 y.o. female who presents with Follow-Up ( DOI 01/30/2018 - R/L Palms - R/L Knee - R Fingers - Worse - Pro Room 1)      Date of injury 1/30/28. Mechanism of injury-\"I fell down as I was walking towads the entrance of [facility]. 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 She continues to have moderate pain but says that the medication does help. She is concerned about prolonged recovery due to age. She has no complaints of further knee issues.     HPI    ROS  PFSH:  WORK STATUS: Restricted activity  PMH:  has no past medical history on file.  MEDS:   Current Outpatient Prescriptions:   •  diclofenac EC (VOLTAREN) 75 MG Tablet Delayed Response, Take 1 Tab by mouth 2 times a day., Disp: 60 Tab, Rfl: 1       Objective:     Pulse 79   Temp 36.4 °C (97.6 °F)   Resp 16   Ht 1.575 m (5' 2\")   Wt 74.8 kg (165 lb)   SpO2 94%   BMI 30.18 kg/m²      Physical Exam    Right hand shows moderate swelling over the dorsum. Ecchymosis remains present on the volar surface of the ring and little fingers. Distal neurovascular is intact.       Assessment/Plan:     1. Nondisplaced fracture of medial phalanx of right little finger, initial encounter for closed fracture    - REFERRAL TO HAND SURGERY    2. Nondisplaced fracture of medial phalanx of right ring finger, initial encounter for closed fracture    - REFERRAL TO HAND SURGERY    3. Contusion of right knee, initial encounter      4. Contusion of hand, unspecified laterality, initial encounter        "

## 2018-02-08 ENCOUNTER — OCCUPATIONAL MEDICINE (OUTPATIENT)
Dept: OCCUPATIONAL MEDICINE | Facility: CLINIC | Age: 58
End: 2018-02-08
Payer: COMMERCIAL

## 2018-02-08 VITALS
BODY MASS INDEX: 30.36 KG/M2 | DIASTOLIC BLOOD PRESSURE: 72 MMHG | HEIGHT: 62 IN | OXYGEN SATURATION: 96 % | SYSTOLIC BLOOD PRESSURE: 124 MMHG | HEART RATE: 88 BPM | TEMPERATURE: 97.4 F | WEIGHT: 165 LBS | RESPIRATION RATE: 14 BRPM

## 2018-02-08 DIAGNOSIS — S62.654A: ICD-10-CM

## 2018-02-08 DIAGNOSIS — S62.656A: ICD-10-CM

## 2018-02-08 PROCEDURE — 99213 OFFICE O/P EST LOW 20 MIN: CPT | Performed by: PREVENTIVE MEDICINE

## 2018-02-08 ASSESSMENT — ENCOUNTER SYMPTOMS: NEUROLOGICAL NEGATIVE: 1

## 2018-02-08 ASSESSMENT — PAIN SCALES - GENERAL: PAINLEVEL: 5=MODERATE PAIN

## 2018-02-08 NOTE — PROGRESS NOTES
"Subjective:      Tammy Ontiveros is a 57 y.o. female who presents with No chief complaint on file.      Date of injury 1/30/28. Mechanism of injury-\"I fell down as I was walking towads the entrance of [employer]. 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 She reports she is improved with less pain and swelling.     HPI    Review of Systems   Neurological: Negative.      PFSH:  WORK STATUS: Restricted activity  PMH:  has no past medical history on file.  MEDS:   Current Outpatient Prescriptions:   •  diclofenac EC (VOLTAREN) 75 MG Tablet Delayed Response, Take 1 Tab by mouth 2 times a day., Disp: 60 Tab, Rfl: 1       Objective:     /72   Pulse 88   Temp 36.3 °C (97.4 °F)   Resp 14   Ht 1.575 m (5' 2\")   Wt 74.8 kg (165 lb)   SpO2 96%   BMI 30.18 kg/m²      Physical Exam    Right hand shows interval improvement. Less ecchymosis. Less swelling. Tenderness persists. Range of motion decreased ring and little fingers.       Assessment/Plan:     1. Nondisplaced fracture of medial phalanx of right little finger, initial encounter for closed fracture  2. Nondisplaced fracture of medial phalanx of right ring finger, initial encounter for closed fracture  Condition ongoing  Claim waiting final determination by insurance.  Hand surgery consultation denied  Restricted activity  Recheck in 2 weeks      "

## 2018-02-08 NOTE — LETTER
"   [address],   [address] [place] [address]  Phone:  [phone] - Fax:  [phone]   [facility] Progress Report and Disability Certification  Date of Service: 2/8/2018   No Show:  No  Date / Time of Next Visit: 2/23/2018@3:30PM   Claim Information   Patient Name: Tammy Ontiveros  Claim Number:     Employer: [employer]  Date of Injury: 1/30/2018     Insurer / TPA: Workers [employer]  ID / SSN:     Occupation: [employer]  Diagnosis: Diagnoses of Nondisplaced fracture of medial phalanx of right little finger, initial encounter for closed fracture and Nondisplaced fracture of medial phalanx of right ring finger, initial encounter for closed fracture were pertinent to this visit.    Medical Information   Related to Industrial Injury?   Comments:awaiting determination from insurance    Subjective Complaints:  Date of injury 1/30/28. Mechanism of injury-\"I fell down as I was walking towads the entrance of [facility]. 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 She reports she is improved with less pain and swelling.   Objective Findings: Right hand shows interval improvement. Less ecchymosis. Less swelling. Tenderness persists. Range of motion decreased ring and little fingers.   Pre-Existing Condition(s):     Assessment:   Condition Improved    Status: Additional Care Required  Permanent Disability:No    Plan:      Diagnostics:      Comments:  Hand surgery consultation denied. Claim under investigation.    Disability Information   Status: Released to Restricted Duty    From:  2/8/2018  Through: 2/23/2018 Restrictions are:     Physical Restrictions   Sitting:    Standing:    Stooping:    Bending:      Squatting:    Walking:    Climbing:    Pushing:      Pulling:    Other:    Reaching Above Shoulder (L):   Reaching Above Shoulder (R):       Reaching Below Shoulder (L):  " Reaching Below Shoulder (R):      Not to exceed Weight Limits   Carrying(hrs):   Weight Limit(lb):   Lifting(hrs):   Weight  Limit(lb): < or = to 10 pounds   Comments: Limited views right hand.    Repetitive Actions   Hands: i.e. Fine Manipulations from Grasping:     Feet: i.e. Operating Foot Controls:     Driving / Operate Machinery:     Physician Name: Herbert Calero M.D. Physician Signature: HERBERT Schneider M.D. e-Signature: Dr. Rob Akbar, Medical Director   Clinic Name / Location: [address],   [address]  [place] [address] Clinic Phone Number: Dept: [phone]   Appointment Time: 10:30 Am Visit Start Time: 10:11 AM   Check-In Time:  10:03 Am Visit Discharge Time:  10:41AM    Original-Treating Physician or Chiropractor    Page 2-Insurer/TPA    Page 3-Employer    Page 4-Employee

## 2018-02-22 ENCOUNTER — OCCUPATIONAL MEDICINE (OUTPATIENT)
Dept: OCCUPATIONAL MEDICINE | Facility: CLINIC | Age: 58
End: 2018-02-22
Payer: COMMERCIAL

## 2018-02-22 VITALS
HEIGHT: 65 IN | SYSTOLIC BLOOD PRESSURE: 128 MMHG | OXYGEN SATURATION: 96 % | HEART RATE: 77 BPM | RESPIRATION RATE: 14 BRPM | DIASTOLIC BLOOD PRESSURE: 80 MMHG | BODY MASS INDEX: 27.49 KG/M2 | TEMPERATURE: 98.2 F | WEIGHT: 165 LBS

## 2018-02-22 DIAGNOSIS — M25.511 RIGHT SHOULDER PAIN, UNSPECIFIED CHRONICITY: ICD-10-CM

## 2018-02-22 DIAGNOSIS — M54.2 CERVICALGIA: ICD-10-CM

## 2018-02-22 DIAGNOSIS — M54.5 LEFT LOW BACK PAIN, UNSPECIFIED CHRONICITY, WITH SCIATICA PRESENCE UNSPECIFIED: ICD-10-CM

## 2018-02-22 PROCEDURE — 99214 OFFICE O/P EST MOD 30 MIN: CPT | Performed by: PREVENTIVE MEDICINE

## 2018-02-22 RX ORDER — ETODOLAC 400 MG/1
400 TABLET, FILM COATED ORAL 2 TIMES DAILY
Qty: 60 TAB | Refills: 1 | Status: SHIPPED | OUTPATIENT
Start: 2018-02-22

## 2018-02-22 RX ORDER — TIZANIDINE 4 MG/1
4 TABLET ORAL EVERY 6 HOURS PRN
Qty: 30 TAB | Refills: 3 | Status: SHIPPED | OUTPATIENT
Start: 2018-02-22

## 2018-02-22 NOTE — PROGRESS NOTES
"Subjective:      Tammy Ontiveros is a 57 y.o. female who presents with Other (WC [facility] DOI 1/30/18 neck, back, (R) arm, (L) thumb (L) hip (R) hand pinky/ring fingers, worse, room 1)      Date of injury 1/30/28. Mechanism of injury-\"I fell down as I was walking towads the entrance of [facility]. 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 She is now seen in surgery. Conservative treatment has been recommended including physical therapy. However, she now has new complaints to different body parts including right upper back and shoulder as well as the left hip/back and right hip. She is unsure whether all of these pain complaints arose out of her original injury. No other upper extremity symptoms or numbness. He seems to be improving.     HPI    ROS  PFSH:  WORK STATUS: Restricted activity  PMH:  has no past medical history on file.  MEDS:   Current Outpatient Prescriptions:   •  etodolac (LODINE) 400 MG tablet, Take 1 Tab by mouth 2 times a day., Disp: 60 Tab, Rfl: 1  •  tizanidine (ZANAFLEX) 4 MG Tab, Take 1 Tab by mouth every 6 hours as needed., Disp: 30 Tab, Rfl: 3  •  diclofenac EC (VOLTAREN) 75 MG Tablet Delayed Response, Take 1 Tab by mouth 2 times a day., Disp: 60 Tab, Rfl: 1       Objective:     /80   Pulse 77   Temp 36.8 °C (98.2 °F)   Resp 14   Ht 1.651 m (5' 5\")   Wt 74.8 kg (165 lb)   SpO2 96%   BMI 27.46 kg/m²      Physical Exam    Appearance: Well-developed, well-nourished.   Mental Status: Mood and Affect normal. Pleasant. Cooperative. Appropriate.    Musculoskeletal: Cervical spine shows good range of motion in all planes. Pain localized to the right parascapular area. Right shoulder shows mild pain on motion. Lower back seems to indicate pain best localized to the left greater than right hip area. She has good flexion and extension of the back. Normal squat.         Assessment/Plan:     1. Right shoulder " pain, unspecified chronicity  2. Cervicalgia  3. Left low back pain, unspecified chronicity, with sciatica presence unspecified  Indeterminate causation regarding these injuries  - etodolac (LODINE) 400 MG tablet; Take 1 Tab by mouth 2 times a day.  Dispense: 60 Tab; Refill: 1  - tizanidine (ZANAFLEX) 4 MG Tab; Take 1 Tab by mouth every 6 hours as needed.  Dispense: 30 Tab; Refill: 3  - REFERRAL TO PHYSICAL THERAPY Reason for Therapy: Eval/Treat/Report    4. Right hand fractures-orthopedic care in progress  5. Bilateral knee contusions-industrial, improved    - Remains restricted work  - Recheck in 3-4 weeks

## 2018-02-22 NOTE — LETTER
"   [address],   [address] [place] [address]  Phone:  [phone] - Fax:  [phone]   Occupational Health [facility] Progress Report and Disability Certification  Date of Service: 2/22/2018   No Show:  No  Date / Time of Next Visit: 3/22/2018 @ 3pm   Claim Information   Patient Name: Tammy Ontiveros  Claim Number:     Employer: [employer]  Date of Injury: 1/30/2018     Insurer / TPA: Workers [employer]  ID / SSN:     Occupation: [employer]  Diagnosis: Diagnoses of Right shoulder pain, unspecified chronicity, Cervicalgia, and Left low back pain, unspecified chronicity, with sciatica presence unspecified were pertinent to this visit.    Medical Information   Related to Industrial Injury? No  Comments:Indeterminate-hand injury and the iknee njuries clearly industrial, however it is difficult to explain how her new symptoms have developed acutely 3 weeks later.    Subjective Complaints:  Date of injury 1/30/28. Mechanism of injury-\"I fell down as I was walking towads the entrance of [employer]. 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 She is now seen in surgery. Conservative treatment has been recommended including physical therapy. However, she now has new complaints to different body parts including right upper back and shoulder as well as the left hip/back and right hip. She is unsure whether all of these pain complaints arose out of her original injury. No other upper extremity symptoms or numbness. He seems to be improving.   Objective Findings: Appearance: Well-developed, well-nourished.   Mental Status: Mood and Affect normal. Pleasant. Cooperative. Appropriate.    Musculoskeletal: Cervical spine shows good range of motion in all planes. Pain localized to the right parascapular area. Right shoulder shows mild pain on motion. Lower back seems to indicate pain best localized to " the left greater than right hip area. She has good flexion and extension of the back. Normal squat.     Pre-Existing Condition(s):     Assessment:   Condition Worsened    Status: Additional Care Required  Permanent Disability:No    Plan: MedicationPT    Diagnostics:      Comments:       Disability Information   Status: Released to Restricted Duty    From:  2/22/2018  Through: 3/22/2018 Restrictions are: Temporary   Physical Restrictions   Sitting:    Standing:    Stooping:  < or = to 1 hr/day Bending:  < or = to 1 hr/day   Squatting:    Walking:    Climbing:    Pushing:      Pulling:  < or = to 1 hr/day Other:    Reaching Above Shoulder (L):   Reaching Above Shoulder (R): < or = 1 hrs/day     Reaching Below Shoulder (L):    Reaching Below Shoulder (R):      Not to exceed Weight Limits   Carrying(hrs):   Weight Limit(lb):   Lifting(hrs):   Weight  Limit(lb): < or = to 10 pounds   Comments: No strenuous work. No use of right hand per orthopedics.    Repetitive Actions   Hands: i.e. Fine Manipulations from Grasping:     Feet: i.e. Operating Foot Controls:     Driving / Operate Machinery:     Physician Name: Herbert Calero M.D. Physician Signature: HERBERT Schneider M.D. e-Signature: Dr. Rob Akbar, Medical Director   Clinic Name / Location: [address],   [address] [place] [address] Clinic Phone Number: Dept: [phone]   Appointment Time: 2:45 Pm Visit Start Time: 2:50 PM   Check-In Time:  2:48 Pm Visit Discharge Time:  3:53pm   Original-Treating Physician or Chiropractor    Page 2-Insurer/TPA    Page 3-Employer    Page 4-Employee

## 2018-03-06 ENCOUNTER — APPOINTMENT (OUTPATIENT)
Dept: RADIOLOGY | Facility: IMAGING CENTER | Age: 58
End: 2018-03-06
Attending: PREVENTIVE MEDICINE
Payer: COMMERCIAL

## 2018-03-06 ENCOUNTER — OCCUPATIONAL MEDICINE (OUTPATIENT)
Dept: OCCUPATIONAL MEDICINE | Facility: CLINIC | Age: 58
End: 2018-03-06
Payer: COMMERCIAL

## 2018-03-06 VITALS
RESPIRATION RATE: 16 BRPM | BODY MASS INDEX: 27.49 KG/M2 | WEIGHT: 165 LBS | HEART RATE: 95 BPM | SYSTOLIC BLOOD PRESSURE: 128 MMHG | DIASTOLIC BLOOD PRESSURE: 74 MMHG | OXYGEN SATURATION: 96 % | HEIGHT: 65 IN

## 2018-03-06 DIAGNOSIS — M25.512 LEFT SHOULDER PAIN, UNSPECIFIED CHRONICITY: ICD-10-CM

## 2018-03-06 DIAGNOSIS — M25.511 RIGHT SHOULDER PAIN, UNSPECIFIED CHRONICITY: ICD-10-CM

## 2018-03-06 DIAGNOSIS — M54.50 BILATERAL LOW BACK PAIN WITHOUT SCIATICA, UNSPECIFIED CHRONICITY: ICD-10-CM

## 2018-03-06 DIAGNOSIS — S62.654A: ICD-10-CM

## 2018-03-06 DIAGNOSIS — M54.2 CERVICALGIA: ICD-10-CM

## 2018-03-06 PROCEDURE — 73030 X-RAY EXAM OF SHOULDER: CPT | Mod: TC,RT | Performed by: PREVENTIVE MEDICINE

## 2018-03-06 PROCEDURE — 72040 X-RAY EXAM NECK SPINE 2-3 VW: CPT | Mod: TC | Performed by: PREVENTIVE MEDICINE

## 2018-03-06 PROCEDURE — 99214 OFFICE O/P EST MOD 30 MIN: CPT | Performed by: PREVENTIVE MEDICINE

## 2018-03-06 PROCEDURE — 72100 X-RAY EXAM L-S SPINE 2/3 VWS: CPT | Mod: TC | Performed by: PREVENTIVE MEDICINE

## 2018-03-06 RX ORDER — PREDNISONE 20 MG/1
40 TABLET ORAL DAILY
Qty: 14 TAB | Refills: 0 | Status: SHIPPED | OUTPATIENT
Start: 2018-03-06 | End: 2018-03-13

## 2018-03-06 ASSESSMENT — ENCOUNTER SYMPTOMS
TINGLING: 0
SENSORY CHANGE: 0
FEVER: 1
CHILLS: 1
DIZZINESS: 0
HEADACHES: 1

## 2018-03-06 ASSESSMENT — PAIN SCALES - GENERAL: PAINLEVEL: 8=MODERATE-SEVERE PAIN

## 2018-03-06 NOTE — PROGRESS NOTES
"Subjective:      Tammy Ontiveros is a 57 y.o. female who presents with Follow-Up (WC DOI neck, back, (R) arm, (L) thumb (L) hip (R) hand pinky/ring fingers - Worse - ROOM 2)      DOI 1/30/18: 58 yo female presents for follow up after fall injury. She reported initially that she fell down  walking towads the entrance of [facility] and landed on her hands and knees. XR right hand showed \"Subtle nondisplaced fractures at the anterior bases of the middle phalanges of the right fourth and fifth fingers.\" XR Right knee showed no acute abnormalities. She also noted development of neck pain, bilateral shoulder pain and lower back pain which is gradual onset. She states she is not becoming physical therapy yet for this body parts. She has been doing hand therapy per the request of the orthopedic surgeon. She does improvement in her hand. However her neck, lower back and shoulders of becoming worse. Denies any numbness or tingling. Does note occasional chills but no fever or respiratory illness.     HPI    Review of Systems   Constitutional: Positive for chills, fever and malaise/fatigue.   Skin: Negative for itching and rash.   Neurological: Positive for headaches. Negative for dizziness, tingling and sensory change.         PMH: No pertinent past medical history to this problem  MEDS: Medications were reviewed in Epic  ALLERGIES: No Known Allergies  SOCHX: Works as an [employer] at [employer]  FH: No pertinent family history to this problem     Objective:     /74   Pulse 95   Resp 16   Ht 1.651 m (5' 5\")   Wt 74.8 kg (165 lb)   SpO2 96%   BMI 27.46 kg/m²      Physical Exam   Constitutional: She is oriented to person, place, and time. She appears well-developed and well-nourished. She appears distressed.   Cardiovascular: Normal rate.    Pulmonary/Chest: Effort normal.   Neurological: She is alert and oriented to person, place, and time.   Skin: Skin is warm and dry.   Psychiatric: She has a normal mood and affect. Judgment " normal.       Cervical: No gross deformity diffuse tenderness bilateral paraspinal musculature. full Range of motion with pain   Shoulders bilaterally: No gross deformity. Diffuse tenderness over entire shoulders bilaterally. Full range of motion with pain. Strength intact.  Lumbar: No gross deformity. Painful range of motion. Diffuse tenderness bilateral paraspinal musculature.         Assessment/Plan:     1. Right shoulder pain, unspecified chronicity  - DX-SHOULDER 2+ RIGHT; Future  - predniSONE (DELTASONE) 20 MG Tab; Take 2 Tabs by mouth every day for 7 days.  Dispense: 14 Tab; Refill: 0    2. Cervicalgia  - DX-CERVICAL SPINE-2 OR 3 VIEWS; Future  - predniSONE (DELTASONE) 20 MG Tab; Take 2 Tabs by mouth every day for 7 days.  Dispense: 14 Tab; Refill: 0    3. Bilateral low back pain without sciatica, unspecified chronicity  - DX-LUMBAR SPINE-2 OR 3 VIEWS; Future  - predniSONE (DELTASONE) 20 MG Tab; Take 2 Tabs by mouth every day for 7 days.  Dispense: 14 Tab; Refill: 0    4. Nondisplaced fracture of medial phalanx of right ring finger, initial encounter for closed fracture  - predniSONE (DELTASONE) 20 MG Tab; Take 2 Tabs by mouth every day for 7 days.  Dispense: 14 Tab; Refill: 0    5. Left shoulder pain, unspecified chronicity  - DX-SHOULDER 2+ LEFT; Future  - predniSONE (DELTASONE) 20 MG Tab; Take 2 Tabs by mouth every day for 7 days.  Dispense: 14 Tab; Refill: 0    XR Right Shoulder: No acute deformity. Mild degenerative changes  XR Left Shoulder: No acute deformity   XR C-Spine: No acute deformity. Mild degenerative changes   XR L-Spine: No acute deformity. Mild degenerative changes.  Begin approved physical therapy  Prescribe prednisone 40 mg ×7 days  Restricted duty  Continue care with hand specialist  Follow-up 2 weeks

## 2018-03-06 NOTE — LETTER
"   [address],   [address] [place] [address]  Phone:  [phone] - Fax:  [phone]   [facility] Progress Report and Disability Certification  Date of Service: 3/6/2018   No Show:  No  Date / Time of Next Visit: 3/28/2018   Claim Information   Patient Name: Tammy Ontiveros  Claim Number:     Employer: [employer]  Date of Injury: 1/30/2018     Insurer / TPA: Workers [employer]  ID / SSN:     Occupation: [employer]  Diagnosis: Diagnoses of Right shoulder pain, unspecified chronicity, Cervicalgia, Bilateral low back pain without sciatica, unspecified chronicity, Nondisplaced fracture of medial phalanx of right ring finger, initial encounter for closed fracture, and Left shoulder pain, unspecified chronicity were pertinent to this visit.    Medical Information   Related to Industrial Injury? No    Subjective Complaints:  DOI 1/30/18: 56 yo female presents for follow up after fall injury. She reported initially that she fell down  walking towads the entrance of [facility] and landed on her hands and knees. XR right hand showed \"Subtle nondisplaced fractures at the anterior bases of the middle phalanges of the right fourth and fifth fingers.\" XR Right knee showed no acute abnormalities. She also noted development of neck pain, bilateral shoulder pain and lower back pain which is gradual onset. She states she is not becoming physical therapy yet for this body parts. She has been doing hand therapy per the request of the orthopedic surgeon. She does improvement in her hand. However her neck, lower back and shoulders of becoming worse. Denies any numbness or tingling. Does note occasional chills but no fever or respiratory illness.   Objective Findings: Cervical: No gross deformity diffuse tenderness bilateral paraspinal musculature. full Range of motion with pain   Shoulders bilaterally: No gross deformity. Diffuse tenderness over entire shoulders bilaterally. " Full range of motion with pain. Strength intact.  Lumbar: No gross deformity. Painful range of motion. Diffuse tenderness bilateral paraspinal musculature.     Pre-Existing Condition(s):     Assessment:   Condition Same    Status: Additional Care Required  Permanent Disability:No    Plan:      Diagnostics:      Comments:  XR Right Shoulder: No acute deformity. Mild degenerative changes  XR Left Shoulder: No acute deformity   XR C-Spine: No acute deformity. Mild degenerative changes   XR L-Spine: No acute deformity. Mild degenerative changes.  Begin approved physical t  herapy  Prescribe prednisone 40 mg ×7 days  Restricted duty  Continue care with hand specialist  Follow-up 2 weeks    Disability Information   Status: Released to Restricted Duty    From:  3/6/2018  Through: 3/28/2018 Restrictions are: Temporary   Physical Restrictions   Sitting:    Standing:    Stooping:  < or = to 1 hr/day Bending:  < or = to 1 hr/day   Squatting:    Walking:    Climbing:    Pushing:      Pulling:    Other:    Reaching Above Shoulder (L):   Reaching Above Shoulder (R):       Reaching Below Shoulder (L):    Reaching Below Shoulder (R):      Not to exceed Weight Limits   Carrying(hrs):   Weight Limit(lb): < or = to 10 pounds Lifting(hrs):   Weight  Limit(lb): < or = to 10 pounds   Comments:      Repetitive Actions   Hands: i.e. Fine Manipulations from Grasping:     Feet: i.e. Operating Foot Controls:     Driving / Operate Machinery:     Physician Name: Igor Toro D.O. Physician Signature: stanley-SignTAYLIGOR PEÑA D.O. e-Signature: Dr. Rob Akbar, Medical Director   Clinic Name / Location: [address],   [address] [place] [address] Clinic Phone Number: Dept: [phone]   Appointment Time: 1:45 Pm Visit Start Time: 1:38 PM   Check-In Time:  1:32 Pm Visit Discharge Time:  4:02 PM    Original-Treating Physician or Chiropractor    Page 2-Insurer/TPA    Page 3-Employer    Page 4-Employee

## 2018-03-13 ENCOUNTER — APPOINTMENT (OUTPATIENT)
Dept: PHYSICAL THERAPY | Facility: REHABILITATION | Age: 58
End: 2018-03-13
Attending: PREVENTIVE MEDICINE
Payer: COMMERCIAL

## 2018-03-14 ENCOUNTER — PHYSICAL THERAPY (OUTPATIENT)
Dept: PHYSICAL THERAPY | Facility: REHABILITATION | Age: 58
End: 2018-03-14
Attending: PREVENTIVE MEDICINE
Payer: COMMERCIAL

## 2018-03-14 DIAGNOSIS — M54.50 ACUTE MIDLINE LOW BACK PAIN WITHOUT SCIATICA: ICD-10-CM

## 2018-03-14 DIAGNOSIS — M54.2 NECK PAIN: ICD-10-CM

## 2018-03-14 PROCEDURE — 97162 PT EVAL MOD COMPLEX 30 MIN: CPT

## 2018-03-14 PROCEDURE — 97014 ELECTRIC STIMULATION THERAPY: CPT

## 2018-03-14 ASSESSMENT — ENCOUNTER SYMPTOMS
PAIN SCALE AT LOWEST: 3
PAIN SCALE AT HIGHEST: 8
QUALITY: BURNING
EXACERBATED BY: OVERHEAD ACTIVITY
ALLEVIATING FACTORS: HEAT APPLICATION
QUALITY: CRAMPING
ALLEVIATING FACTORS: PHYSICAL THERAPY
ALLEVIATING FACTORS: PAIN MEDICATION
EXACERBATED BY: DRESSING
QUALITY: SHARP
PAIN TIMING: WHEN ACTIVE
QUALITY: ACHING
PAIN SCALE: 8
PAIN TIMING: IN THE EVENING

## 2018-03-14 NOTE — OP THERAPY EVALUATION
Outpatient Physical Therapy  INITIAL EVALUATION    [facility] Physical Therapy [address]  [address]  [place] [address]  Phone:  [phone]  Fax:  [phone]    Date of Evaluation: 2018    Patient: Tammy Ontiveros  YOB: 1960  MRN: [id number]     Referring Provider: Herbert Calero M.D.  [address] [facility]  [address]  [place] [address]   Referring Diagnosis Right shoulder pain, unspecified chronicity [M25.511];Cervicalgia [M54.2];Left low back pain, unspecified chronicity, with sciatica presence unspecified [M54.5]     Time Calculation  Start time: 0930  Stop time: 1050 Time Calculation (min): 80 minutes       Date of Onset of Impairment: 18  Date PT Care Plan Established or Reviewed: 3/29/18  Date PT Treatment Started: 3/29/18        Chief Complaint: Neck Pain; Shoulder Pain; and Back Pain    Visit Diagnoses     ICD-10-CM   1. Neck pain M54.2   2. Acute midline low back pain without sciatica M54.5         Subjective:   History of Present Illness:     Mechanism of injury:  Pt slipped and fell onto R side w/outstretched arm. Pt continued to work and hand began to swell more. Pt returned to occupational health and rec'd medicine, xray indicated fxs in 4th and 5th mets. Pt began light duty and waited for specialty referral. Pt has been receiving hand therapy elsewhere. Currently presents with c/o R neck/shoulder/LBP.  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  Sleep disturbance:  Non-restful sleep  Pain:     Current pain ratin    At best pain rating:  3    At worst pain ratin    Location:  R>L Neck/Shoulder, LBP    Quality:  Aching, burning, sharp and cramping    Pain timing:  In the evening and when active    Relieving factors:  Physical therapy, pain medication and heat application    Aggravating factors:  Overhead activity and dressing (pushing, puling, reaching)  Patient Goals:     Patient goals  for therapy:  Decreased pain and increased motion      No past medical history on file.  No past surgical history on file.  Social History   Substance Use Topics   • Smoking status: Not on file   • Smokeless tobacco: Not on file   • Alcohol use Not on file     Family and Occupational History     Social History   • Marital status:      Spouse name: N/A   • Number of children: N/A   • Years of education: N/A       Objective     Shoulder Screen    Shoulder active range of motion within functional limits.    Active Range of Motion     Cervical Spine   Flexion: Active cervical flexion: 1 inch CTC.  Extension: Active cervical extension: 45 deg.  Left rotation: Active left cervical rotation: 45 deg.  Right rotation: Active right cervical rotation: 52 deg.    Strength:      Upper extremities   Left upper extremity strength within functional limits  Right upper extremity strength within functional limits        Therapeutic Exercises (CPT 26836):     1. HEP, 3 curves posture and seated scap retract    Therapeutic Treatments and Modalities:     1. E Stim Unattended (CPT 96215), IFC and MHP to C/S    Time-based treatments/modalities:  Therapeutic exercise minutes (CPT 81770): 5 minutes       Assessment, Response and Plan:   Assessment details:  57 yr old female w/onset c/s, UE, and hand pain after fall at work. Pt's sxs consistent w/muscular strain and neural tension. Pt will benefit from skilled PT to address the following goals.    Goals:   Short Term Goals:   25% decreased c/o dressing  25% decreased c/o pulling for ADLs  25% decreased c/o reaching fwd for ADLs  AROM C/S rot > 50 L  Short term goal time span:  1-2 weeks      Long Term Goals:    75% decreased c/o dressing  75% decreased c/o pulling for ADLs  75% decreased c/o reaching fwd for ADLs  AROM C/S rot > 60 bilaterally  Long term goal time span:  6-8 weeks    Plan:   Therapy options:  Physical therapy treatment to continue  Planned therapy interventions:  E  Stim Unattended (CPT 92391), Manual Therapy (CPT 70635), Mechanical Traction (CPT 91001), Neuromuscular Re-education (CPT 11512) and Therapeutic Exercise (CPT 54692)  Frequency:  2x week  Duration in weeks:  6  Plan details:  Cont skilled PT to address deep c/s stability, shoulder ROM, and functional mobility.      Functional Limitation G-Codes and Severity Modifiers  Neck Disability Total: 32  Kostas Roland Low Back Pain and Disability Score: 87.5  Quickdash General Total Score: 68.18     Referring provider co-signature:  I have reviewed this plan of care and my co-signature certifies the need for services.    Physician Signature: ________________________________ Date: ______________

## 2018-03-15 ENCOUNTER — APPOINTMENT (OUTPATIENT)
Dept: PHYSICAL THERAPY | Facility: REHABILITATION | Age: 58
End: 2018-03-15
Attending: PREVENTIVE MEDICINE
Payer: COMMERCIAL

## 2018-03-20 ENCOUNTER — APPOINTMENT (OUTPATIENT)
Dept: PHYSICAL THERAPY | Facility: REHABILITATION | Age: 58
End: 2018-03-20
Attending: PREVENTIVE MEDICINE
Payer: COMMERCIAL

## 2018-03-20 DIAGNOSIS — M54.50 ACUTE MIDLINE LOW BACK PAIN WITHOUT SCIATICA: ICD-10-CM

## 2018-03-20 DIAGNOSIS — M54.2 NECK PAIN: ICD-10-CM

## 2018-03-20 PROCEDURE — 97014 ELECTRIC STIMULATION THERAPY: CPT

## 2018-03-20 PROCEDURE — 97110 THERAPEUTIC EXERCISES: CPT

## 2018-03-21 NOTE — OP THERAPY DAILY TREATMENT
Outpatient Physical Therapy  DAILY TREATMENT     [facility] Physical Therapy [place] [address] [facility]  [address]  [place] [address]  Phone:  [phone]  Fax:  [phone]    Date: 03/20/2018    Patient: Tammy Ontiveros  YOB: 1960  MRN: [id number]     Time Calculation  Start time: 1300  Stop time: 1350 Time Calculation (min): 50 minutes     Chief Complaint: Neck Pain; Shoulder Pain; and Back Pain    Visit #: 2    SUBJECTIVE:  Pt presents w/increased c/o neck, shoulder, and LBP. States e-stim helped temporarily. Also reports newer onset of stomach pain.    OBJECTIVE:        Therapeutic Exercises (CPT 97304):     1. Nu Step, x10 min, L1    2. Foam Roller, pec stretch, alt UE, angels    3. [place] w/orange TB, 20x10 sec    Therapeutic Treatments and Modalities:     1. E Stim Unattended (CPT 39102), IFC and MHP to C/S, x15 min    Time-based treatments/modalities:  Therapeutic exercise minutes (CPT 55733): 30 minutes     Pain rating before treatment: 8  Pain rating after treatment: 8    ASSESSMENT:   Pt w/antalgic mobility. Tolerated ther ex however w/o increased c/o pain.    PLAN/RECOMMENDATIONS:   Planned interventions for next visit: E-stim unattended (CPT 04632), manual therapy (CPT 45540), mechanical traction (CPT 19690) and therapeutic exercise (CPT 72666). MFR, CT mobs, scap stab w/TB, 3D mobility.

## 2018-03-22 ENCOUNTER — PHYSICAL THERAPY (OUTPATIENT)
Dept: PHYSICAL THERAPY | Facility: REHABILITATION | Age: 58
End: 2018-03-22
Attending: PREVENTIVE MEDICINE
Payer: COMMERCIAL

## 2018-03-22 DIAGNOSIS — M54.50 ACUTE MIDLINE LOW BACK PAIN WITHOUT SCIATICA: ICD-10-CM

## 2018-03-22 DIAGNOSIS — M54.2 NECK PAIN: ICD-10-CM

## 2018-03-22 PROCEDURE — 97014 ELECTRIC STIMULATION THERAPY: CPT

## 2018-03-22 PROCEDURE — 97110 THERAPEUTIC EXERCISES: CPT

## 2018-03-22 NOTE — OP THERAPY DAILY TREATMENT
Outpatient Physical Therapy  DAILY TREATMENT     [facility] Physical [address].  [address]  [place] [address]  Phone:  [phone]  Fax:  [phone]    Date: 03/22/2018    Patient: Tammy Ontiveros  YOB: 1960  MRN: [id number]     Time Calculation  Start time: 1430  Stop time: 1520 Time Calculation (min): 50 minutes     Chief Complaint: Neck Pain and Back Pain    Visit #: 3    SUBJECTIVE:  Pt reports slight decrease in pain, currently focused across shoulders and R side of T/S.    OBJECTIVE:    Therapeutic Exercises (CPT 24173):     1. Nu Step, x10 min, L1    2. Foam Roller, pec stretch, alt UE, serratus    3. Rows w/pink TB, x20    4. Long lats w/pink TB, x20    Therapeutic Treatments and Modalities:     1. E Stim Unattended (CPT 18493), IFC and MHP to C/S, x15 min    Time-based treatments/modalities:  Therapeutic exercise minutes (CPT 31754): 30 minutes     Pain rating before treatment: 5  Pain rating after treatment: 5    ASSESSMENT:   Pain cont to limit normal spinal mobility.    PLAN/RECOMMENDATIONS:   Planned interventions for next visit: E-stim unattended (CPT 82939), manual therapy (CPT 76698), mechanical traction (CPT 31809) and therapeutic exercise (CPT 22927). MFR, CT mobs, scap stab w/TB, 3D mobility.

## 2018-03-27 ENCOUNTER — APPOINTMENT (OUTPATIENT)
Dept: PHYSICAL THERAPY | Facility: REHABILITATION | Age: 58
End: 2018-03-27
Attending: PREVENTIVE MEDICINE
Payer: COMMERCIAL

## 2018-03-27 DIAGNOSIS — M54.2 NECK PAIN: ICD-10-CM

## 2018-03-27 DIAGNOSIS — M54.50 ACUTE MIDLINE LOW BACK PAIN WITHOUT SCIATICA: ICD-10-CM

## 2018-03-27 PROCEDURE — 97014 ELECTRIC STIMULATION THERAPY: CPT

## 2018-03-27 PROCEDURE — 97110 THERAPEUTIC EXERCISES: CPT

## 2018-03-27 NOTE — OP THERAPY DAILY TREATMENT
Outpatient Physical Therapy  DAILY TREATMENT     [facility] Physical [address].  [address]  [place] [address]  Phone:  [phone]  Fax:  [phone]    Date: 03/27/2018    Patient: Tammy Ontiveros  YOB: 1960  MRN: [id number]     Time Calculation  Start time: 1200  Stop time: 1230 Time Calculation (min): 30 minutes     Chief Complaint: Back Pain and Shoulder Pain    Visit #: 4    SUBJECTIVE:  Pt continues to report pain across shoulders and into L UE. Pt also very concerned re: swelling in thighs, knees, and along jaw bilaterally.    OBJECTIVE:    Therapeutic Exercises (CPT 60003):     1. Nu Step, x10 min, L1    2. SL T/S rotation, x5 ea    3. Qped cat/cow, x5    4. Qped T/S rot, x5 ea    5. Reviewed HEP/TB scap stab    Therapeutic Treatments and Modalities:     1. E Stim Unattended (CPT 61600), IFC and MHP to C/S, x15 min    Time-based treatments/modalities:  Therapeutic exercise minutes (CPT 49169): 30 minutes     Pain rating before treatment: 5  Pain rating after treatment: 5    ASSESSMENT:   Pt cont w/very antalgic movements but typically responds well to ther ex. Pt cont c/o multiple locations of pain and swelling that my need further work up.     PLAN/RECOMMENDATIONS:   Planned interventions for next visit: E-stim unattended (CPT 59043), manual therapy (CPT 98173), mechanical traction (CPT 03502) and therapeutic exercise (CPT 66410). MFR, CT mobs, scap stab w/TB, 3D mobility.

## 2018-03-28 ENCOUNTER — OCCUPATIONAL MEDICINE (OUTPATIENT)
Dept: OCCUPATIONAL MEDICINE | Facility: CLINIC | Age: 58
End: 2018-03-28
Payer: COMMERCIAL

## 2018-03-28 VITALS
DIASTOLIC BLOOD PRESSURE: 76 MMHG | TEMPERATURE: 97.5 F | BODY MASS INDEX: 27.49 KG/M2 | WEIGHT: 165 LBS | SYSTOLIC BLOOD PRESSURE: 124 MMHG | HEART RATE: 78 BPM | HEIGHT: 65 IN | OXYGEN SATURATION: 96 % | RESPIRATION RATE: 14 BRPM

## 2018-03-28 DIAGNOSIS — S80.01XA CONTUSION OF RIGHT KNEE, INITIAL ENCOUNTER: ICD-10-CM

## 2018-03-28 DIAGNOSIS — M25.512 LEFT SHOULDER PAIN, UNSPECIFIED CHRONICITY: ICD-10-CM

## 2018-03-28 DIAGNOSIS — M54.2 CERVICALGIA: ICD-10-CM

## 2018-03-28 DIAGNOSIS — M54.50 BILATERAL LOW BACK PAIN WITHOUT SCIATICA, UNSPECIFIED CHRONICITY: ICD-10-CM

## 2018-03-28 DIAGNOSIS — M25.511 RIGHT SHOULDER PAIN, UNSPECIFIED CHRONICITY: ICD-10-CM

## 2018-03-28 DIAGNOSIS — R42 DIZZINESS: ICD-10-CM

## 2018-03-28 DIAGNOSIS — R19.00 SWELLING ABDOMEN: ICD-10-CM

## 2018-03-28 PROCEDURE — 99213 OFFICE O/P EST LOW 20 MIN: CPT | Performed by: PREVENTIVE MEDICINE

## 2018-03-28 ASSESSMENT — ENCOUNTER SYMPTOMS
DIZZINESS: 1
NERVOUS/ANXIOUS: 1
BLURRED VISION: 0
DIARRHEA: 0
FEVER: 1
WEAKNESS: 1
VOMITING: 0
CONSTIPATION: 0
MYALGIAS: 1
BACK PAIN: 1
HEADACHES: 1
NECK PAIN: 1
NAUSEA: 0
DOUBLE VISION: 0
PALPITATIONS: 0
CHILLS: 1

## 2018-03-28 ASSESSMENT — PAIN SCALES - GENERAL: PAINLEVEL: 10=SEVERE PAIN

## 2018-03-28 NOTE — LETTER
"   [address],   [address] [place] [address]  Phone:  [phone] - Fax:  [phone]   [facility] Progress Report and Disability Certification  Date of Service: 3/28/2018   No Show:  No  Date / Time of Next Visit: 5/1/2018@10am   Claim Information   Patient Name: Tammy Ontiveros  Claim Number:     Employer: [employer]  Date of Injury: 1/30/2018     Insurer / TPA: Workers [employer]  ID / SSN:     Occupation: [employer]  Diagnosis: Diagnoses of Right shoulder pain, unspecified chronicity, Cervicalgia, Bilateral low back pain without sciatica, unspecified chronicity, Left shoulder pain, unspecified chronicity, Contusion of right knee, initial encounter, Swelling abdomen, and Dizziness were pertinent to this visit.    Medical Information   Related to Industrial Injury? No    Subjective Complaints:  DOI 1/30/18: 56 yo female presents for follow up after fall injury. She reported initially that she fell down  walking towads the entrance of [facility] and landed on her hands and knees. XR right hand showed \"Subtle nondisplaced fractures at the anterior bases of the middle phalanges of the right fourth and fifth fingers.\" XR Right knee, right shoulder, left shoulder, c-spine, and l-spine were negative. Being seen by Dr Quigley for hand fracture. Patient states that her bilateral shoulder pain, neck pain, lower back pain and bilateral knee pain have worsened. She states she does not tolerate physical therapy very well. She states she's also noticed swelling throughout both arms, lower extremities and neck. This comes and goes. She also notes dizziness, fatigue and headaches. She still notes occasional fevers, chills and stress. She did not hit her head on the fall. She states that her right hand still improved. Patient is adamant that she needs to be seen by an orthopedic specialist.   Objective Findings: Cervical: No gross deformity diffuse tenderness bilateral " paraspinal musculature. full Range of motion with pain   Shoulders bilaterally: No gross deformity. Diffuse nonspecific tenderness over entire shoulders bilaterally to light touch. Full range of motion with pain. Strength intact.  Lumbar: No gross deformity. Painful range of motion. Diffuse nonspecific tenderness bilateral paraspinal musculature.   Pre-Existing Condition(s):     Assessment:   Condition Worsened    Status: Additional Care Required  Permanent Disability:No    Plan:      Diagnostics:      Comments:  Advised patient that her myriad of symptoms do not come from a fall where she landed on her hands and knees. Advised her to seek care with a primary care physician for workup rheumatological or other types of maladies could cause the symptoms.  Advis  ed to continue physical therapy  Placed referral orthopedics, but advised patient this will likely be denied  Do not recommend any medications at this time  Follow-up 4 weeks    Disability Information   Status: Released to Restricted Duty    From:  3/28/2018  Through: 5/1/2018 Restrictions are: Temporary   Physical Restrictions   Sitting:    Standing:    Stooping:  < or = to 1 hr/day Bending:  < or = to 1 hr/day   Squatting:    Walking:    Climbing:    Pushing:      Pulling:    Other:    Reaching Above Shoulder (L):   Reaching Above Shoulder (R):       Reaching Below Shoulder (L):    Reaching Below Shoulder (R):      Not to exceed Weight Limits   Carrying(hrs):   Weight Limit(lb): < or = to 10 pounds Lifting(hrs):   Weight  Limit(lb): < or = to 10 pounds   Comments:      Repetitive Actions   Hands: i.e. Fine Manipulations from Grasping:     Feet: i.e. Operating Foot Controls:     Driving / Operate Machinery:     Physician Name: Igor Toro D.O. Physician Signature: stanley-SignTAYLIGOR PEÑA D.O. e-Signature: Dr. Rob Akbar, Medical Director   Clinic Name / Location: [address],   [address]  [place] [address] Clinic  Phone Number: Dept: [phone]   Appointment Time: 9:45 Am Visit Start Time: 9:58 AM   Check-In Time:  9:51 Am Visit Discharge Time:  10:46am   Original-Treating Physician or Chiropractor    Page 2-Insurer/TPA    Page 3-Employer    Page 4-Employee

## 2018-03-28 NOTE — PROGRESS NOTES
"Subjective:      Tammy Ontiveros is a 57 y.o. female who presents with Follow-Up (([facility] DOI 1/30/18 neck, back, (R) arm, (L) thumb (L) hip (R) hand pinky/ring fingers,- Worse - ROOM 3)      DOI 1/30/18: 58 yo female presents for follow up after fall injury. She reported initially that she fell down  walking towads the entrance of [facility] and landed on her hands and knees. XR right hand showed \"Subtle nondisplaced fractures at the anterior bases of the middle phalanges of the right fourth and fifth fingers.\" XR Right knee, right shoulder, left shoulder, c-spine, and l-spine were negative. Being seen by Dr Quigley for hand fracture. Patient states that her bilateral shoulder pain, neck pain, lower back pain and bilateral knee pain have worsened. She states she does not tolerate physical therapy very well. She states she's also noticed swelling throughout both arms, lower extremities and neck. This comes and goes. She also notes dizziness, fatigue and headaches. She still notes occasional fevers, chills and stress. She did not hit her head on the fall. She states that her right hand still improved. Patient is adamant that she needs to be seen by an orthopedic specialist.     HPI    Review of Systems   Constitutional: Positive for chills, fever and malaise/fatigue.   Eyes: Negative for blurred vision and double vision.   Cardiovascular: Negative for chest pain and palpitations.   Gastrointestinal: Negative for constipation, diarrhea, nausea and vomiting.   Genitourinary: Negative for dysuria and urgency.   Musculoskeletal: Positive for back pain, joint pain, myalgias and neck pain.   Skin: Negative for itching and rash.   Neurological: Positive for dizziness, weakness and headaches.   Psychiatric/Behavioral: The patient is nervous/anxious.           Objective:     /76   Pulse 78   Temp 36.4 °C (97.5 °F)   Resp 14   Ht 1.651 m (5' 5\")   Wt 74.8 kg (165 lb)   SpO2 96%   BMI 27.46 kg/m²      Physical Exam "   Constitutional: She is oriented to person, place, and time. She appears well-developed and well-nourished. She appears distressed.   Abdominal: Soft. Bowel sounds are normal.   Neurological: She is alert and oriented to person, place, and time. No cranial nerve deficit.   Skin: Skin is warm and dry. She is not diaphoretic. No erythema. No pallor.       Cervical: No gross deformity diffuse tenderness bilateral paraspinal musculature. full Range of motion with pain   Shoulders bilaterally: No gross deformity. Diffuse nonspecific tenderness over entire shoulders bilaterally to light touch. Full range of motion with pain. Strength intact.  Lumbar: No gross deformity. Painful range of motion. Diffuse nonspecific tenderness bilateral paraspinal musculature.       Assessment/Plan:     1. Right shoulder pain, unspecified chronicity  2. Cervicalgia  3. Bilateral low back pain without sciatica, unspecified chronicity  4. Left shoulder pain, unspecified chronicity  5. Contusion of right knee, initial encounter  6. Swelling abdomen  7. Dizziness    Advised to continue physical therapy  Placed referral orthopedics, but advised patient this will likely be denied  Do not recommend any medications at this time  Follow-up 4 weeks     Advised patient that her myriad of symptoms do not come from a fall where she landed on her hands and knees. Advised her to seek care with a primary care physician for workup rheumatological or other types of maladies could cause the symptoms. The symptoms she relates her clearly nonindustrial given that this started several weeks after injury, are non-specific and many are non-musculoskeletal in nature. During exam patient also stated she was very upset that the orthopedic specialist was authorized for her hand and could not treat her shoulder and neck.

## 2018-03-29 ENCOUNTER — APPOINTMENT (OUTPATIENT)
Dept: PHYSICAL THERAPY | Facility: REHABILITATION | Age: 58
End: 2018-03-29
Attending: PREVENTIVE MEDICINE
Payer: COMMERCIAL

## 2018-03-29 DIAGNOSIS — M54.50 ACUTE MIDLINE LOW BACK PAIN WITHOUT SCIATICA: ICD-10-CM

## 2018-03-29 DIAGNOSIS — M54.2 NECK PAIN: ICD-10-CM

## 2018-03-29 PROCEDURE — 97014 ELECTRIC STIMULATION THERAPY: CPT

## 2018-03-29 PROCEDURE — 97110 THERAPEUTIC EXERCISES: CPT

## 2018-03-29 NOTE — OP THERAPY DAILY TREATMENT
"  Outpatient Physical Therapy  DAILY TREATMENT     [facility] Physical [address].  [address]  [place] [address]  Phone:  [phone]  Fax:  [phone]    Date: 03/29/2018    Patient: Tammy Ontiveros  YOB: 1960  MRN: [id number]     Time Calculation  Start time: 1430  Stop time: 1515 Time Calculation (min): 45 minutes     Chief Complaint: Back Problem; Shoulder Injury; Back Injury; Neck Problem; Wrist Injury; and Work-Related Injury    Visit #: 5    SUBJECTIVE:  States multiple sites of pain 8/10.     OBJECTIVE:  Current objective measures: neck ,shoulder, lumbar ROM 90% with c/o pain all mvts.          Therapeutic Exercises (CPT 06880):     1. Nu step 8' 3    2. Swiss ball seated rows , 10x3    3. Cat/camel, 10    4. Prayer stretch, 3x10\"    5. Quadraped UE lifts with t-band    6. UBE, 5'    Therapeutic Treatments and Modalities:     1. E Stim Unattended (CPT 30644), cer/upper t spine    Time-based treatments/modalities:  Therapeutic exercise minutes (CPT 03307): 30 minutes              ASSESSMENT:   Response to treatment: significant pain -like behavior. States lower cervical/upper t-spine hurts the most today    PLAN/RECOMMENDATIONS:   Plan for treatment: therapy treatment to continue next visit.  Planned interventions for next visit: E-stim unattended (CPT 46203), manual therapy (CPT 11067) and therapeutic exercise (CPT 66991).      "

## 2018-03-30 ENCOUNTER — HOSPITAL ENCOUNTER (OUTPATIENT)
Dept: LAB | Facility: MEDICAL CENTER | Age: 58
End: 2018-03-30
Attending: FAMILY MEDICINE
Payer: COMMERCIAL

## 2018-03-30 LAB
ALBUMIN SERPL BCP-MCNC: 4.2 G/DL (ref 3.2–4.9)
ALBUMIN/GLOB SERPL: 1.2 G/DL
ALP SERPL-CCNC: 91 U/L (ref 30–99)
ALT SERPL-CCNC: 15 U/L (ref 2–50)
ANION GAP SERPL CALC-SCNC: 9 MMOL/L (ref 0–11.9)
AST SERPL-CCNC: 15 U/L (ref 12–45)
BASOPHILS # BLD AUTO: 1.9 % (ref 0–1.8)
BASOPHILS # BLD: 0.12 K/UL (ref 0–0.12)
BILIRUB SERPL-MCNC: 0.5 MG/DL (ref 0.1–1.5)
BUN SERPL-MCNC: 11 MG/DL (ref 8–22)
CALCIUM SERPL-MCNC: 9.6 MG/DL (ref 8.5–10.5)
CHLORIDE SERPL-SCNC: 105 MMOL/L (ref 96–112)
CHOLEST SERPL-MCNC: 205 MG/DL (ref 100–199)
CO2 SERPL-SCNC: 26 MMOL/L (ref 20–33)
CREAT SERPL-MCNC: 0.57 MG/DL (ref 0.5–1.4)
EOSINOPHIL # BLD AUTO: 0.2 K/UL (ref 0–0.51)
EOSINOPHIL NFR BLD: 3.2 % (ref 0–6.9)
ERYTHROCYTE [DISTWIDTH] IN BLOOD BY AUTOMATED COUNT: 43.5 FL (ref 35.9–50)
ERYTHROCYTE [SEDIMENTATION RATE] IN BLOOD BY WESTERGREN METHOD: 29 MM/HOUR (ref 0–30)
GLOBULIN SER CALC-MCNC: 3.4 G/DL (ref 1.9–3.5)
GLUCOSE SERPL-MCNC: 116 MG/DL (ref 65–99)
HCT VFR BLD AUTO: 47.4 % (ref 37–47)
HDLC SERPL-MCNC: 36 MG/DL
HGB BLD-MCNC: 15.6 G/DL (ref 12–16)
IMM GRANULOCYTES # BLD AUTO: 0.01 K/UL (ref 0–0.11)
IMM GRANULOCYTES NFR BLD AUTO: 0.2 % (ref 0–0.9)
LDLC SERPL CALC-MCNC: 120 MG/DL
LYMPHOCYTES # BLD AUTO: 1.88 K/UL (ref 1–4.8)
LYMPHOCYTES NFR BLD: 30.5 % (ref 22–41)
MCH RBC QN AUTO: 28.5 PG (ref 27–33)
MCHC RBC AUTO-ENTMCNC: 32.9 G/DL (ref 33.6–35)
MCV RBC AUTO: 86.5 FL (ref 81.4–97.8)
MONOCYTES # BLD AUTO: 0.34 K/UL (ref 0–0.85)
MONOCYTES NFR BLD AUTO: 5.5 % (ref 0–13.4)
NEUTROPHILS # BLD AUTO: 3.62 K/UL (ref 2–7.15)
NEUTROPHILS NFR BLD: 58.7 % (ref 44–72)
NRBC # BLD AUTO: 0 K/UL
NRBC BLD-RTO: 0 /100 WBC
PLATELET # BLD AUTO: 355 K/UL (ref 164–446)
PMV BLD AUTO: 9.4 FL (ref 9–12.9)
POTASSIUM SERPL-SCNC: 4 MMOL/L (ref 3.6–5.5)
PROT SERPL-MCNC: 7.6 G/DL (ref 6–8.2)
RBC # BLD AUTO: 5.48 M/UL (ref 4.2–5.4)
SODIUM SERPL-SCNC: 140 MMOL/L (ref 135–145)
TRIGL SERPL-MCNC: 247 MG/DL (ref 0–149)
WBC # BLD AUTO: 6.2 K/UL (ref 4.8–10.8)

## 2018-03-30 PROCEDURE — 84439 ASSAY OF FREE THYROXINE: CPT

## 2018-03-30 PROCEDURE — 84443 ASSAY THYROID STIM HORMONE: CPT

## 2018-03-30 PROCEDURE — 86235 NUCLEAR ANTIGEN ANTIBODY: CPT

## 2018-03-30 PROCEDURE — 86376 MICROSOMAL ANTIBODY EACH: CPT

## 2018-03-30 PROCEDURE — 86200 CCP ANTIBODY: CPT

## 2018-03-30 PROCEDURE — 83036 HEMOGLOBIN GLYCOSYLATED A1C: CPT

## 2018-03-30 PROCEDURE — 80053 COMPREHEN METABOLIC PANEL: CPT

## 2018-03-30 PROCEDURE — 86644 CMV ANTIBODY: CPT

## 2018-03-30 PROCEDURE — 36415 COLL VENOUS BLD VENIPUNCTURE: CPT

## 2018-03-30 PROCEDURE — 85652 RBC SED RATE AUTOMATED: CPT

## 2018-03-30 PROCEDURE — 84481 FREE ASSAY (FT-3): CPT

## 2018-03-30 PROCEDURE — 86038 ANTINUCLEAR ANTIBODIES: CPT

## 2018-03-30 PROCEDURE — 80061 LIPID PANEL: CPT

## 2018-03-30 PROCEDURE — 86225 DNA ANTIBODY NATIVE: CPT

## 2018-03-30 PROCEDURE — 85025 COMPLETE CBC W/AUTO DIFF WBC: CPT

## 2018-03-30 PROCEDURE — 86645 CMV ANTIBODY IGM: CPT

## 2018-03-31 LAB
EST. AVERAGE GLUCOSE BLD GHB EST-MCNC: 137 MG/DL
HBA1C MFR BLD: 6.4 % (ref 0–5.6)
T3FREE SERPL-MCNC: 4.1 PG/ML (ref 2.4–4.2)
T4 FREE SERPL-MCNC: 0.87 NG/DL (ref 0.53–1.43)
THYROPEROXIDASE AB SERPL-ACNC: <0.2 IU/ML (ref 0–9)
TSH SERPL DL<=0.005 MIU/L-ACNC: 1.31 UIU/ML (ref 0.38–5.33)

## 2018-04-01 LAB
CCP IGG SERPL-ACNC: 3 UNITS (ref 0–19)
CMV IGG SERPL IA-ACNC: >10 U/ML
CMV IGM SERPL IA-ACNC: <8 AU/ML
NUCLEAR IGG SER QL IA: NORMAL

## 2018-04-03 ENCOUNTER — PHYSICAL THERAPY (OUTPATIENT)
Dept: PHYSICAL THERAPY | Facility: REHABILITATION | Age: 58
End: 2018-04-03
Attending: PREVENTIVE MEDICINE
Payer: COMMERCIAL

## 2018-04-03 DIAGNOSIS — M54.50 ACUTE MIDLINE LOW BACK PAIN WITHOUT SCIATICA: ICD-10-CM

## 2018-04-03 DIAGNOSIS — M54.2 NECK PAIN: ICD-10-CM

## 2018-04-03 PROCEDURE — 97014 ELECTRIC STIMULATION THERAPY: CPT

## 2018-04-05 ENCOUNTER — PHYSICAL THERAPY (OUTPATIENT)
Dept: PHYSICAL THERAPY | Facility: REHABILITATION | Age: 58
End: 2018-04-05
Attending: PREVENTIVE MEDICINE
Payer: COMMERCIAL

## 2018-04-05 DIAGNOSIS — M54.2 NECK PAIN: ICD-10-CM

## 2018-04-05 DIAGNOSIS — M54.50 ACUTE MIDLINE LOW BACK PAIN WITHOUT SCIATICA: ICD-10-CM

## 2018-04-05 PROCEDURE — 97014 ELECTRIC STIMULATION THERAPY: CPT

## 2018-04-05 PROCEDURE — 97110 THERAPEUTIC EXERCISES: CPT

## 2018-04-05 NOTE — OP THERAPY DAILY TREATMENT
Outpatient Physical Therapy  DAILY TREATMENT     [facility] Physical Therapy [address].  [address]  [place] [address]  Phone:  [phone]  Fax:  [phone]    Date: 04/05/2018    Patient: Tammy Ontiveros  YOB: 1960  MRN: [id number]     Time Calculation  Start time: 1030  Stop time: 1120 Time Calculation (min): 50 minutes     Chief Complaint: Neck Pain and Back Pain    Visit #: 7    SUBJECTIVE:  Pt reports slight decrease in sxs both neck and back, also notices decrease in swelling in multiple locations.    OBJECTIVE:    Therapeutic Exercises (CPT 37182):     1. Nu Step, x10 min    2. Supine ext over ball, x20    3. Prone ski jump over ball, 3x30 sec    4. Rows w/blue TB seated on ball, x20    5. Wall ball squats, x20    Therapeutic Treatments and Modalities:     1. E Stim Unattended (CPT 33829), cer/upper t spine    Time-based treatments/modalities:  Therapeutic exercise minutes (CPT 24063): 30 minutes      ASSESSMENT:   Pt w/increased tolerance for exercises, avoided foam roller due to report of increased LBP after last time.    PLAN/RECOMMENDATIONS:   Plan for treatment: therapy treatment to continue next visit.  Progressing spinal mobility, scap and L/S stab exercises.

## 2018-04-09 ENCOUNTER — PHYSICAL THERAPY (OUTPATIENT)
Dept: PHYSICAL THERAPY | Facility: REHABILITATION | Age: 58
End: 2018-04-09
Attending: PREVENTIVE MEDICINE
Payer: COMMERCIAL

## 2018-04-09 DIAGNOSIS — M54.2 NECK PAIN: ICD-10-CM

## 2018-04-09 DIAGNOSIS — M54.50 ACUTE MIDLINE LOW BACK PAIN WITHOUT SCIATICA: ICD-10-CM

## 2018-04-09 PROCEDURE — 97110 THERAPEUTIC EXERCISES: CPT

## 2018-04-09 PROCEDURE — 97014 ELECTRIC STIMULATION THERAPY: CPT

## 2018-04-09 NOTE — OP THERAPY DAILY TREATMENT
Outpatient Physical Therapy  DAILY TREATMENT     [facility] Physical [address].  [address]  [place] [address]  Phone:  [phone]  Fax:  [phone]    Date: 04/09/2018    Patient: Tammy Ontiveros  YOB: 1960  MRN: [id number]     Time Calculation  Start time: 0900  Stop time: 0950 Time Calculation (min): 50 minutes     Chief Complaint: Back Pain and Shoulder Pain    Visit #: 8    SUBJECTIVE:  Pt reports feeling a little bit better. Continues w/L-sided LBP, R abdomen c/o, and bilateral upper shoulder/neck sxs.     OBJECTIVE:    Therapeutic Exercises (CPT 61748):     1. UBE, x5 min    2. Foam Roller (soft), pec stretch, alt UE OH and lat, angels    3. Shuttle DL press, 3x10, 5c    4. B Horiz abd/ER w/blue TB, 3x5    Therapeutic Treatments and Modalities:     1. E Stim Unattended (CPT 08494), IFC and MHP to L/S in supine, x15 min    Time-based treatments/modalities:  Therapeutic exercise minutes (CPT 62611): 30 minutes      ASSESSMENT:   Pt tolerated foam roller well, most limited w/OH movement today. Continues w/overall antalgic mat mobility/transitions    PLAN/RECOMMENDATIONS:   Plan for treatment: therapy treatment to continue next visit.  Progressing spinal mobility, scap and L/S stab exercises, fascial reaches

## 2018-04-11 ENCOUNTER — PHYSICAL THERAPY (OUTPATIENT)
Dept: PHYSICAL THERAPY | Facility: REHABILITATION | Age: 58
End: 2018-04-11
Attending: PREVENTIVE MEDICINE
Payer: COMMERCIAL

## 2018-04-11 DIAGNOSIS — M54.2 NECK PAIN: ICD-10-CM

## 2018-04-11 DIAGNOSIS — M54.50 ACUTE MIDLINE LOW BACK PAIN WITHOUT SCIATICA: ICD-10-CM

## 2018-04-11 PROCEDURE — 97014 ELECTRIC STIMULATION THERAPY: CPT

## 2018-04-11 PROCEDURE — 97110 THERAPEUTIC EXERCISES: CPT

## 2018-04-11 NOTE — OP THERAPY DAILY TREATMENT
Outpatient Physical Therapy  DAILY TREATMENT     [facility] Physical Therapy [address]  [address]  [place] [address]  Phone:  [phone]  Fax:  [phone]    Date: 04/11/2018    Patient: Tammy Ontiveros  YOB: 1960  MRN: [id number]     Time Calculation  Start time: 1200  Stop time: 1250 Time Calculation (min): 50 minutes     Chief Complaint: Neck Pain and Back Pain    Visit #: 9    SUBJECTIVE:  Pt states sxs are about the same as last time. Reports cont upper shoulder/neck sxs and ache along spine. Also cont w/c/o aching along R side of thorax, worse w/breathing and twisting.    OBJECTIVE:    Therapeutic Exercises (CPT 23218):     1. Nu Step, x10 min    2. Foam Roller (soft), pec stretch, alt UE OH and lat, angels    3. Shuttle DL press, 3x10, 5c    4. Qped cat/cow, x10    5. Qped T/S rot, x10 ea    6. Child's pose, x30 sec    Therapeutic Treatments and Modalities:     1. E Stim Unattended (CPT 88750), IFC and MHP to T/S in supine, x15 min    Time-based treatments/modalities:  Therapeutic exercise minutes (CPT 50884): 30 minutes      ASSESSMENT:   Sxs appear more centralized, continues w/antalgic movements, especially rotation.    PLAN/RECOMMENDATIONS:   Plan for treatment: therapy treatment to continue next visit.  Progressing spinal mobility, scap and L/S stab exercises, fascial reaches

## 2018-04-17 ENCOUNTER — PHYSICAL THERAPY (OUTPATIENT)
Dept: PHYSICAL THERAPY | Facility: REHABILITATION | Age: 58
End: 2018-04-17
Attending: PREVENTIVE MEDICINE
Payer: COMMERCIAL

## 2018-04-17 DIAGNOSIS — M54.2 NECK PAIN: ICD-10-CM

## 2018-04-17 DIAGNOSIS — M54.50 ACUTE MIDLINE LOW BACK PAIN WITHOUT SCIATICA: ICD-10-CM

## 2018-04-17 PROCEDURE — 97014 ELECTRIC STIMULATION THERAPY: CPT

## 2018-04-17 PROCEDURE — 97110 THERAPEUTIC EXERCISES: CPT

## 2018-04-17 PROCEDURE — 97140 MANUAL THERAPY 1/> REGIONS: CPT

## 2018-04-17 NOTE — OP THERAPY DAILY TREATMENT
Outpatient Physical Therapy  DAILY TREATMENT     [facility] Physical Therapy [address]  [address]  [place] [address]  Phone:  [phone]  Fax:  [phone]    Date: 04/17/2018    Patient: Tammy Ontiveros  YOB: 1960  MRN: [id number]     Time Calculation  Start time: 1100  Stop time: 1150 Time Calculation (min): 50 minutes     Chief Complaint: Back Pain and Neck Pain    Visit #: 10    SUBJECTIVE:  Pt reports continued fluctuation in swelling in multiple areas including neck/throat, ribcage, stomach and thighs.    OBJECTIVE:    Therapeutic Exercises (CPT 28601):     1. Nu Step, x10 min    2. Seated fascial reaches, x5 ea    3. SL T/S rot, x10 ea    Therapeutic Treatments and Modalities:     1. E Stim Unattended (CPT 24916), IFC and MHP to L ribcage in SL, x15 min    2. Manual Therapy (CPT 69015), IASTM lats, infra, teres, serratus, Scap mobs w/SL abduction and horiz abd     Time-based treatments/modalities:  Manual therapy minutes (CPT 07250): 15 minutes  Therapeutic exercise minutes (CPT 59005): 15 minutes      ASSESSMENT:   Pt w/poor fascial mobility worst along L side of neck and trunk. Mobility improved after IASTM    PLAN/RECOMMENDATIONS:   Plan for treatment: therapy treatment to continue next visit.  Cont. progressing spinal mobility, scap and L/S stab exercises, fascial reaches

## 2018-04-19 ENCOUNTER — PHYSICAL THERAPY (OUTPATIENT)
Dept: PHYSICAL THERAPY | Facility: REHABILITATION | Age: 58
End: 2018-04-19
Attending: PREVENTIVE MEDICINE
Payer: COMMERCIAL

## 2018-04-19 DIAGNOSIS — M54.50 ACUTE MIDLINE LOW BACK PAIN WITHOUT SCIATICA: ICD-10-CM

## 2018-04-19 DIAGNOSIS — M54.2 NECK PAIN: ICD-10-CM

## 2018-04-19 PROCEDURE — 97140 MANUAL THERAPY 1/> REGIONS: CPT

## 2018-04-19 PROCEDURE — 97014 ELECTRIC STIMULATION THERAPY: CPT

## 2018-04-19 PROCEDURE — 97110 THERAPEUTIC EXERCISES: CPT

## 2018-04-20 NOTE — OP THERAPY DAILY TREATMENT
Outpatient Physical Therapy  DAILY TREATMENT     [facility] Physical Therapy [address].  [address]  [place] [address]  Phone:  [phone]  Fax:  [phone]    Date: 04/19/2018    Patient: Tammy Ontiveros  YOB: 1960  MRN: [id number]     Time Calculation  Start time: 1430  Stop time: 1520 Time Calculation (min): 50 minutes     Chief Complaint: Back Pain    Visit #: 11    SUBJECTIVE:  Pt reports continued fluctuation in swelling in multiple areas including neck/throat, ribcage, stomach and thighs.    OBJECTIVE:    Therapeutic Exercises (CPT 91146):     1. Nu Step, x10 min    Therapeutic Treatments and Modalities:     1. E Stim Unattended (CPT 65332), IFC and MHP to L ribcage in SL, x15 min    2. Manual Therapy (CPT 16006), IASTM Bilateral lats, infra, teres, serratus, Scap mobs w/SL abduction and horiz abd     Time-based treatments/modalities:  Manual therapy minutes (CPT 70691): 20 minutes  Therapeutic exercise minutes (CPT 06840): 10 minutes      ASSESSMENT:   Continues w/poor fascial mobility.    PLAN/RECOMMENDATIONS:   Plan for treatment: therapy treatment to continue next visit.  Cont. progressing spinal mobility, scap and L/S stab exercises, fascial reaches. Pt's authorization is up after next visit. Will need to re-evaluate further needs.

## 2018-04-26 ENCOUNTER — PHYSICAL THERAPY (OUTPATIENT)
Dept: PHYSICAL THERAPY | Facility: REHABILITATION | Age: 58
End: 2018-04-26
Attending: PREVENTIVE MEDICINE
Payer: COMMERCIAL

## 2018-04-26 DIAGNOSIS — M54.2 NECK PAIN: ICD-10-CM

## 2018-04-26 DIAGNOSIS — M54.50 ACUTE MIDLINE LOW BACK PAIN WITHOUT SCIATICA: ICD-10-CM

## 2018-04-26 PROCEDURE — 97014 ELECTRIC STIMULATION THERAPY: CPT

## 2018-04-26 PROCEDURE — 97110 THERAPEUTIC EXERCISES: CPT

## 2018-04-26 PROCEDURE — 97140 MANUAL THERAPY 1/> REGIONS: CPT

## 2018-04-26 NOTE — OP THERAPY DAILY TREATMENT
Outpatient Physical Therapy  DAILY TREATMENT     [facility] Physical [address]  [address]  [place] [address]  Phone:  [phone]  Fax:  [phone]    Date: 04/26/2018    Patient: Tammy Ontiveros  YOB: 1960  MRN: [id number]     Time Calculation  Start time: 1000  Stop time: 1050 Time Calculation (min): 50 minutes     Chief Complaint: Neck Pain and Shoulder Pain    Visit #: 12    SUBJECTIVE:  Pt reports continued fluctuation in swelling in multiple areas including neck/throat, ribcage, stomach and thighs.    OBJECTIVE:    Therapeutic Exercises (CPT 81222):     1. Nu Step, x10 min    2. Foam Roller, pec stretch, alt UE    Therapeutic Treatments and Modalities:     1. E Stim Unattended (CPT 07457), IFC and MHP to B upper back, x15 min    2. Manual Therapy (CPT 93769), IASTM Bilateral lats, infra, teres, serratus, Scap mobs w/SL abduction and horiz abd     C/S AROM: R rot = 52, deg, L rot = 60    Time-based treatments/modalities:  Manual therapy minutes (CPT 82697): 15 minutes  Therapeutic exercise minutes (CPT 76976): 15 minutes      ASSESSMENT:   Continues w/antalgic mobility. Periscapular restrictions bilaterally contribute to limited AROM. Pt has met STGs and is performing most ADLs w/50% subjective improvement. AROM C/S slowly increasing.    PLAN/RECOMMENDATIONS:   Plan for treatment: therapy treatment to continue next visit.  This is pt's last authorized visit. Pt will need new referral and authorization to continue PT. Recommend 1-2 visits for 3 more weeks.

## 2018-04-30 ENCOUNTER — APPOINTMENT (OUTPATIENT)
Dept: PHYSICAL THERAPY | Facility: REHABILITATION | Age: 58
End: 2018-04-30
Attending: PREVENTIVE MEDICINE
Payer: COMMERCIAL

## 2018-05-13 ENCOUNTER — HOSPITAL ENCOUNTER (OUTPATIENT)
Dept: RADIOLOGY | Facility: MEDICAL CENTER | Age: 58
End: 2018-05-13
Attending: PHYSICAL MEDICINE & REHABILITATION
Payer: COMMERCIAL

## 2018-05-13 DIAGNOSIS — M79.10 MYALGIA: ICD-10-CM

## 2018-05-13 DIAGNOSIS — S13.4XXA: ICD-10-CM

## 2018-05-13 DIAGNOSIS — S43.491A OTHER SPRAIN OF RIGHT SHOULDER JOINT, INITIAL ENCOUNTER: ICD-10-CM

## 2018-05-13 PROCEDURE — 72141 MRI NECK SPINE W/O DYE: CPT

## 2018-05-13 PROCEDURE — 73221 MRI JOINT UPR EXTREM W/O DYE: CPT | Mod: RT

## 2018-05-23 ENCOUNTER — HOSPITAL ENCOUNTER (OUTPATIENT)
Dept: RADIOLOGY | Facility: MEDICAL CENTER | Age: 58
End: 2018-05-23
Attending: FAMILY MEDICINE
Payer: COMMERCIAL

## 2018-05-23 DIAGNOSIS — R10.10 UPPER ABDOMINAL PAIN: ICD-10-CM

## 2018-05-23 PROCEDURE — 76700 US EXAM ABDOM COMPLETE: CPT

## 2018-05-24 ENCOUNTER — HOSPITAL ENCOUNTER (OUTPATIENT)
Dept: LAB | Facility: MEDICAL CENTER | Age: 58
End: 2018-05-24
Attending: PHYSICAL MEDICINE & REHABILITATION
Payer: COMMERCIAL

## 2018-05-24 LAB
25(OH)D3 SERPL-MCNC: 32 NG/ML (ref 30–100)
ALBUMIN SERPL BCP-MCNC: 4.5 G/DL (ref 3.2–4.9)
ALBUMIN/GLOB SERPL: 1.5 G/DL
ALP SERPL-CCNC: 89 U/L (ref 30–99)
ALT SERPL-CCNC: 14 U/L (ref 2–50)
ANION GAP SERPL CALC-SCNC: 7 MMOL/L (ref 0–11.9)
AST SERPL-CCNC: 14 U/L (ref 12–45)
BASOPHILS # BLD AUTO: 2 % (ref 0–1.8)
BASOPHILS # BLD: 0.15 K/UL (ref 0–0.12)
BILIRUB SERPL-MCNC: 0.4 MG/DL (ref 0.1–1.5)
BUN SERPL-MCNC: 13 MG/DL (ref 8–22)
CALCIUM SERPL-MCNC: 9.2 MG/DL (ref 8.5–10.5)
CHLORIDE SERPL-SCNC: 106 MMOL/L (ref 96–112)
CO2 SERPL-SCNC: 27 MMOL/L (ref 20–33)
CREAT SERPL-MCNC: 0.55 MG/DL (ref 0.5–1.4)
EOSINOPHIL # BLD AUTO: 0.17 K/UL (ref 0–0.51)
EOSINOPHIL NFR BLD: 2.3 % (ref 0–6.9)
ERYTHROCYTE [DISTWIDTH] IN BLOOD BY AUTOMATED COUNT: 44.5 FL (ref 35.9–50)
ERYTHROCYTE [SEDIMENTATION RATE] IN BLOOD BY WESTERGREN METHOD: 20 MM/HOUR (ref 0–30)
EST. AVERAGE GLUCOSE BLD GHB EST-MCNC: 134 MG/DL
GLOBULIN SER CALC-MCNC: 3 G/DL (ref 1.9–3.5)
GLUCOSE SERPL-MCNC: 114 MG/DL (ref 65–99)
HBA1C MFR BLD: 6.3 % (ref 0–5.6)
HCT VFR BLD AUTO: 46.3 % (ref 37–47)
HGB BLD-MCNC: 15.1 G/DL (ref 12–16)
IMM GRANULOCYTES # BLD AUTO: 0.02 K/UL (ref 0–0.11)
IMM GRANULOCYTES NFR BLD AUTO: 0.3 % (ref 0–0.9)
LYMPHOCYTES # BLD AUTO: 2.03 K/UL (ref 1–4.8)
LYMPHOCYTES NFR BLD: 27.7 % (ref 22–41)
MCH RBC QN AUTO: 28.4 PG (ref 27–33)
MCHC RBC AUTO-ENTMCNC: 32.6 G/DL (ref 33.6–35)
MCV RBC AUTO: 87 FL (ref 81.4–97.8)
MONOCYTES # BLD AUTO: 0.43 K/UL (ref 0–0.85)
MONOCYTES NFR BLD AUTO: 5.9 % (ref 0–13.4)
NEUTROPHILS # BLD AUTO: 4.53 K/UL (ref 2–7.15)
NEUTROPHILS NFR BLD: 61.8 % (ref 44–72)
NRBC # BLD AUTO: 0 K/UL
NRBC BLD-RTO: 0 /100 WBC
PLATELET # BLD AUTO: 376 K/UL (ref 164–446)
PMV BLD AUTO: 9.5 FL (ref 9–12.9)
POTASSIUM SERPL-SCNC: 4 MMOL/L (ref 3.6–5.5)
PROT SERPL-MCNC: 7.5 G/DL (ref 6–8.2)
RBC # BLD AUTO: 5.32 M/UL (ref 4.2–5.4)
RHEUMATOID FACT SER IA-ACNC: <10 IU/ML (ref 0–14)
SODIUM SERPL-SCNC: 140 MMOL/L (ref 135–145)
TSH SERPL DL<=0.005 MIU/L-ACNC: 1.28 UIU/ML (ref 0.38–5.33)
WBC # BLD AUTO: 7.3 K/UL (ref 4.8–10.8)

## 2018-05-24 PROCEDURE — 86431 RHEUMATOID FACTOR QUANT: CPT

## 2018-05-24 PROCEDURE — 85652 RBC SED RATE AUTOMATED: CPT

## 2018-05-24 PROCEDURE — 85025 COMPLETE CBC W/AUTO DIFF WBC: CPT

## 2018-05-24 PROCEDURE — 80053 COMPREHEN METABOLIC PANEL: CPT

## 2018-05-24 PROCEDURE — 82306 VITAMIN D 25 HYDROXY: CPT

## 2018-05-24 PROCEDURE — 36415 COLL VENOUS BLD VENIPUNCTURE: CPT

## 2018-05-24 PROCEDURE — 84443 ASSAY THYROID STIM HORMONE: CPT

## 2018-05-24 PROCEDURE — 86038 ANTINUCLEAR ANTIBODIES: CPT

## 2018-05-24 PROCEDURE — 83036 HEMOGLOBIN GLYCOSYLATED A1C: CPT

## 2018-05-25 LAB — NUCLEAR IGG SER QL IA: NORMAL

## 2018-05-30 ENCOUNTER — HOSPITAL ENCOUNTER (OUTPATIENT)
Dept: LAB | Facility: MEDICAL CENTER | Age: 58
End: 2018-05-30
Attending: FAMILY MEDICINE
Payer: COMMERCIAL

## 2018-05-30 PROCEDURE — 83516 IMMUNOASSAY NONANTIBODY: CPT

## 2018-05-30 PROCEDURE — 36415 COLL VENOUS BLD VENIPUNCTURE: CPT

## 2018-06-01 LAB — GLIADIN PEPTIDE+TTG IGA+IGG SER QL IA: 6 UNITS (ref 0–19)

## 2018-06-18 ENCOUNTER — PHYSICAL THERAPY (OUTPATIENT)
Dept: PHYSICAL THERAPY | Facility: REHABILITATION | Age: 58
End: 2018-06-18
Attending: PHYSICAL MEDICINE & REHABILITATION
Payer: COMMERCIAL

## 2018-06-18 DIAGNOSIS — M75.41 SHOULDER IMPINGEMENT SYNDROME, RIGHT: ICD-10-CM

## 2018-06-18 DIAGNOSIS — M54.2 NECK PAIN: ICD-10-CM

## 2018-06-18 PROCEDURE — 97535 SELF CARE MNGMENT TRAINING: CPT

## 2018-06-18 PROCEDURE — 97140 MANUAL THERAPY 1/> REGIONS: CPT

## 2018-06-18 PROCEDURE — 97014 ELECTRIC STIMULATION THERAPY: CPT

## 2018-06-18 NOTE — OP THERAPY DAILY TREATMENT
Outpatient Physical Therapy  DAILY TREATMENT     [facility] Physical [address].  [address]  [place] [address]  Phone:  [phone]  Fax:  [phone]    Date: 06/18/2018    Patient: Tammy Ontiveros  YOB: 1960  MRN: [id number]     Time Calculation  Start time: 0830  Stop time: 0930 Time Calculation (min): 60 minutes     Chief Complaint: Neck Pain and Shoulder Pain    Visit #: 13    SUBJECTIVE:  Pt presents after 2 month lapse in PT. Pt continues to report issues w/neck and shoulder pain however, more troubling to her is a diffuse sense of pressure/air in the front of her neck and into ribcage and abdomen, worse R vs L.    OBJECTIVE:  AROM:  Shoulder: B WFL w/increased c/o pain w/all R UE movements  C/S: Flex = CTC, Ext = 50 deg w/increased pain, Rot R = 40 deg, L = 50      Therapeutic Treatments and Modalities:     1. Manual Therapy (CPT 15605), MFR scalenes, UT, LS, paraspinals, pecs, // pt w/apparent involuntary rxn to pressure in thorax as well as light touch throughout c/s and upper thorax    2. E Stim Unattended (CPT 37033), IFC and MHP to c/s and t/s, x15 min    3. Functional Training, Self Care (CPT 43324), bed mobility, desensitization techniques, form for lifting and reaching w/UEs maintaining neutral spine, x15 min    Time-based treatments/modalities:  Manual therapy minutes (CPT 99996): 30 minutes  Functional training, self care minutes (CPT 67558): 15 minutes       Pain rating before treatment: 5  Pain rating after treatment: 4    ASSESSMENT:   Pt continues w/sxs consistent w/RC derangement and chronic impingement, and c/s strain and discogenic pain related to poor postural habits. Pt also presents w/additional sxs that are more diffuse. Differential diagnoses include phrenic nerve contribution, lymphatic blockage and c/s instability.     PLAN/RECOMMENDATIONS:   Plan for treatment: therapy treatment to continue next visit.  Planned interventions for next  visit: E-stim unattended (CPT 03419), manual therapy (CPT 79685) and therapeutic exercise (CPT 64553).

## 2018-06-18 NOTE — OP THERAPY PROGRESS SUMMARY
Outpatient Physical Therapy  UPDATED PLAN OF CARE/ PROGRESS NOTE      [facility] Physical Therapy [place] [address]  [address]  [place] [address]  Phone:  [phone]  Fax:  [phone]    Date of Visit: 06/18/2018    Patient: Tammy Ontiveros  YOB: 1960  MRN: [id number]     Referring Provider: Noah Lyle M.D.  [address] [facility] [address]  [address]  [place] [address]   Referring Diagnosis Other sprain of right shoulder joint, initial encounter [S43.491A];Sprain of ligaments of cervical spine, subsequent encounter [S13.4XXD]     Visit Diagnoses     ICD-10-CM   1. Neck pain M54.2   2. Shoulder impingement syndrome, right M75.41       Rehab Potential: good    Physical Therapy Occurrence Codes    Date of onset of impairment:  1/30/18   Date physical therapy care plan established or reviewed:  3/29/18   Date physical therapy treatment started:  3/29/18          Progress Report Period: 3/14/18 - 6/18/18      Objective Findings and Assessment:   Patient progression towards goals: 50% decreased c/o dressing  50% decreased c/o pulling for ADLs  50% decreased c/o reaching fwd for ADLs  AROM C/S rot L = 50 deg, R = 40 deg    Objective findings and assessment details: Pt continues w/sxs consistent w/RC derangement and chronic impingement, and c/s strain and discogenic pain related to poor postural habits. Pt also presents w/additional sxs that are more diffuse. Differential diagnoses include phrenic nerve contribution, lymphatic blockage and c/s instability.     Goals:   Short Term Goals:   Pt to report 50% decreased c/o involuntary sound/air related to thorax pressure  Pt able to stabilize neutral c/s w/deep stabilizers and dynamic challenge  AROM C/S rot R > 50   Short term goal time span:  2-4 weeks      Long Term Goals:    No c/o dressing  80% decreased c/o pulling for ADLs  80% decreased c/o reaching fwd for ADLs  AROM C/S rot > 60 bilaterally  80% decreased c/o involuntary sound/air related to  thorax pressure  Long term goal time span:  6-8 weeks    Plan:   Planned therapy interventions:  E Stim Unattended (CPT 33804), Mechanical Traction (CPT 16706), Neuromuscular Re-education (CPT 18125) and Therapeutic Exercise (CPT 68063)  Frequency:  2x week  Duration in weeks:  4  Plan details:  Continued skilled PT to address updated goals. Will assess possible phrenic nerve, c/s instability and lymph contributions.        Referring provider co-signature:  I have reviewed this plan of care and my co-signature certifies the need for services.    Physician Signature: ________________________________ Date: ______________

## 2018-06-20 ENCOUNTER — HOSPITAL ENCOUNTER (OUTPATIENT)
Dept: RADIOLOGY | Facility: MEDICAL CENTER | Age: 58
End: 2018-06-20
Attending: FAMILY MEDICINE
Payer: COMMERCIAL

## 2018-06-20 DIAGNOSIS — R14.0 ABDOMINAL DISTENTION: ICD-10-CM

## 2018-06-20 PROCEDURE — 78227 HEPATOBIL SYST IMAGE W/DRUG: CPT

## 2018-06-27 ENCOUNTER — HOSPITAL ENCOUNTER (OUTPATIENT)
Dept: LAB | Facility: MEDICAL CENTER | Age: 58
End: 2018-06-27
Attending: FAMILY MEDICINE
Payer: COMMERCIAL

## 2018-06-27 ENCOUNTER — HOSPITAL ENCOUNTER (OUTPATIENT)
Dept: RADIOLOGY | Facility: MEDICAL CENTER | Age: 58
End: 2018-06-27
Attending: FAMILY MEDICINE
Payer: COMMERCIAL

## 2018-06-27 DIAGNOSIS — J18.0 BRONCHIAL PNEUMONIA: ICD-10-CM

## 2018-06-27 LAB
CANCER AG125 SERPL-ACNC: 3.8 U/ML (ref 0–35)
CEA SERPL-MCNC: 0.5 NG/ML (ref 0–3)

## 2018-06-27 PROCEDURE — 36415 COLL VENOUS BLD VENIPUNCTURE: CPT

## 2018-06-27 PROCEDURE — 71046 X-RAY EXAM CHEST 2 VIEWS: CPT

## 2018-06-27 PROCEDURE — 82378 CARCINOEMBRYONIC ANTIGEN: CPT

## 2018-06-27 PROCEDURE — 86304 IMMUNOASSAY TUMOR CA 125: CPT

## 2018-06-28 ENCOUNTER — PHYSICAL THERAPY (OUTPATIENT)
Dept: PHYSICAL THERAPY | Facility: REHABILITATION | Age: 58
End: 2018-06-28
Attending: PHYSICAL MEDICINE & REHABILITATION
Payer: COMMERCIAL

## 2018-06-28 DIAGNOSIS — M54.2 NECK PAIN: ICD-10-CM

## 2018-06-28 DIAGNOSIS — M54.50 ACUTE MIDLINE LOW BACK PAIN WITHOUT SCIATICA: ICD-10-CM

## 2018-06-28 DIAGNOSIS — M75.41 SHOULDER IMPINGEMENT SYNDROME, RIGHT: ICD-10-CM

## 2018-06-28 PROCEDURE — 97140 MANUAL THERAPY 1/> REGIONS: CPT

## 2018-06-28 PROCEDURE — 97110 THERAPEUTIC EXERCISES: CPT

## 2018-06-28 PROCEDURE — 97014 ELECTRIC STIMULATION THERAPY: CPT

## 2018-06-28 NOTE — OP THERAPY DAILY TREATMENT
Outpatient Physical Therapy  DAILY TREATMENT     [facility] Physical Therapy [place] [address] [address]  [address]  [place] [address]  Phone:  [phone]  Fax:  [phone]    Date: 06/28/2018    Patient: Tammy Ontiveros  YOB: 1960  MRN: [id number]     Time Calculation  Start time: 0930  Stop time: 1030 Time Calculation (min): 60 minutes     Chief Complaint: Back Pain; Neck Pain; and Shoulder Pain    Visit #: 14    SUBJECTIVE:  Pt w/continued c/o air being trapped in abdomen and throat. Pt continues w/difficulty reaching overhead. Pt is very concerned that the next diagnostic test is over a month away.    OBJECTIVE:      Therapeutic Exercises (CPT 24340):     1. Deep c/s flex in hooklying, 10x5 sec    2. Alt UE in hooklying w/neutral spine, x5 ea, //pt reports UEs feeling weak and painful after 5 reps    Therapeutic Treatments and Modalities:     1. Manual Therapy (CPT 23781), MFR subocc, diaphragm, //unable to access diaphragm for proper release    2. E Stim Unattended (CPT 81121), IFC and MHP to t/s, x15 min    Time-based treatments/modalities:  Manual therapy minutes (CPT 48175): 10 minutes  Therapeutic exercise minutes (CPT 77267): 20 minutes       Pain rating before treatment: 5  Pain rating after treatment: 5    ASSESSMENT:   Pt w/cont involuntary rxn to pressure in thorax as well as. Pt negative for c/s instability. Unable access diaphragm due to abnormal pressure/resistance in the abdomen. Pt would benefit from an endoscopy to assess current c/o. Per pt, this has already been scheduled.    PLAN/RECOMMENDATIONS:   Will cont PT x2-3 visits to address c/s and shoulder issues, as well as phrenic nerve contribution. However, pt presents w/additional abdominal c/o that will likely not be addressed by PT alone.

## 2018-06-29 ENCOUNTER — HOSPITAL ENCOUNTER (OUTPATIENT)
Dept: LAB | Facility: MEDICAL CENTER | Age: 58
End: 2018-06-29
Payer: COMMERCIAL

## 2018-06-29 LAB
BDY FAT % MEASURED: 43.9 %
BP DIAS: 72 MMHG
BP SYS: 135 MMHG
CHOLEST SERPL-MCNC: 192 MG/DL (ref 100–199)
DIABETES HTDIA: NO
EVENT NAME HTEVT: NORMAL
FASTING HTFAS: YES
GLUCOSE SERPL-MCNC: 116 MG/DL (ref 65–99)
HDLC SERPL-MCNC: 33 MG/DL
HYPERTENSION HTHYP: NO
LDLC SERPL CALC-MCNC: 130 MG/DL
SCREENING LOC CITY HTCIT: NORMAL
SCREENING LOC STATE HTSTA: NORMAL
SCREENING LOCATION HTLOC: NORMAL
SMOKING HTSMO: NO
SUBSCRIBER ID HTSID: NORMAL
TRIGL SERPL-MCNC: 144 MG/DL (ref 0–149)

## 2018-06-29 PROCEDURE — 82947 ASSAY GLUCOSE BLOOD QUANT: CPT

## 2018-06-29 PROCEDURE — 80061 LIPID PANEL: CPT

## 2018-06-29 PROCEDURE — 36415 COLL VENOUS BLD VENIPUNCTURE: CPT

## 2018-06-29 PROCEDURE — S5190 WELLNESS ASSESSMENT BY NONPH: HCPCS

## 2018-07-02 ENCOUNTER — PHYSICAL THERAPY (OUTPATIENT)
Dept: PHYSICAL THERAPY | Facility: REHABILITATION | Age: 58
End: 2018-07-02
Attending: PHYSICAL MEDICINE & REHABILITATION
Payer: COMMERCIAL

## 2018-07-02 DIAGNOSIS — M75.41 SHOULDER IMPINGEMENT SYNDROME, RIGHT: ICD-10-CM

## 2018-07-02 DIAGNOSIS — M54.2 NECK PAIN: ICD-10-CM

## 2018-07-02 PROCEDURE — 97140 MANUAL THERAPY 1/> REGIONS: CPT

## 2018-07-02 PROCEDURE — 97110 THERAPEUTIC EXERCISES: CPT

## 2018-07-02 PROCEDURE — 97014 ELECTRIC STIMULATION THERAPY: CPT

## 2018-07-02 NOTE — OP THERAPY DAILY TREATMENT
Outpatient Physical Therapy  DAILY TREATMENT     [facility] Physical Therapy [address]  [address]  [place] [address]  Phone:  [phone]  Fax:  [phone]    Date: 07/02/2018    Patient: Tammy Ontiveros  YOB: 1960  MRN: [id number]     Time Calculation  Start time: 0900  Stop time: 1000 Time Calculation (min): 60 minutes     Chief Complaint: Shoulder Pain and Neck Pain    Visit #: 15    SUBJECTIVE:  Pt reports feeling better overall, but continues w/tightness across neck and shoulders, R > L. Pt also continues w/increased c/o abdominal pressure and difficulty w/normal breathing.    OBJECTIVE:      Therapeutic Exercises (CPT 47409):     1. Foam Roller, pec stretch, alt UE OH and lat, angels    2. Prone ski jump over ball, 3x30 sec    Therapeutic Treatments and Modalities:     1. Manual Therapy (CPT 14805), Diaphragm MFR on roller    2. E Stim Unattended (CPT 30582), IFC and MHP to upper T/S, x15 min    Time-based treatments/modalities:  Manual therapy minutes (CPT 10506): 10 minutes  Therapeutic exercise minutes (CPT 89258): 20 minutes       Pain rating before treatment: 4-5  Pain rating after treatment: 4-5    ASSESSMENT:   Pt continues w/focus on abdominal sxs, which at this time have been explored to the extent that is appropriate. Pt continues w/mild impingement sxs and c/s strain.    PLAN/RECOMMENDATIONS:   Cont PT to address RC strength, scap stability, T/S mobility. Recommended pt f/u w/primary re: continued abdominal issues for proper diagnostics, specialty referral and/or additional rx as needed.

## 2018-07-05 ENCOUNTER — PHYSICAL THERAPY (OUTPATIENT)
Dept: PHYSICAL THERAPY | Facility: REHABILITATION | Age: 58
End: 2018-07-05
Attending: PHYSICAL MEDICINE & REHABILITATION
Payer: COMMERCIAL

## 2018-07-05 DIAGNOSIS — M75.41 SHOULDER IMPINGEMENT SYNDROME, RIGHT: ICD-10-CM

## 2018-07-05 DIAGNOSIS — M54.2 NECK PAIN: ICD-10-CM

## 2018-07-05 PROCEDURE — 97110 THERAPEUTIC EXERCISES: CPT

## 2018-07-05 PROCEDURE — 97014 ELECTRIC STIMULATION THERAPY: CPT

## 2018-07-05 NOTE — OP THERAPY DAILY TREATMENT
"  Outpatient Physical Therapy  DAILY TREATMENT     [facility] Physical Therapy [address]  [address]  [place] [address]  Phone:  [phone]  Fax:  [phone]    Date: 07/05/2018    Patient: Tammy Ontiveros  YOB: 1960  MRN: [id number]     Time Calculation  Start time: 1100  Stop time: 1150 Time Calculation (min): 50 minutes     Chief Complaint: Back Pain    Visit #: 16    SUBJECTIVE:  Pt reports feeling better overall, but continues w/tightness across neck and shoulders, R > L. Pt also continues w/increased c/o abdominal pressure and difficulty w/normal breathing.    OBJECTIVE:      Therapeutic Exercises (CPT 15825):     1. Foam Roller, pec stretch, alt UE OH, serratus punches    2. Prone ski jump over ball, 2x1 min    3. Rows w/blue TB, x20    4. Lats w/blue TB, x20    Therapeutic Treatments and Modalities:     1. E Stim Unattended (CPT 29425), IFC and MHP to upper T/S, x15 min    Time-based treatments/modalities:  Therapeutic exercise minutes (CPT 40937): 30 minutes       Pain rating before treatment: 6 (medial scapula)  Pain rating after treatment: \"just heavy\"    ASSESSMENT:   Pt w/slow antalgic movement. C/o difficulty breathing still present in any position. Tolerated all ther ex at slow pace.    PLAN/RECOMMENDATIONS:   Cont PT, SL T/S rot, scaption, qped cat/cow.       "

## 2018-07-09 ENCOUNTER — PHYSICAL THERAPY (OUTPATIENT)
Dept: PHYSICAL THERAPY | Facility: REHABILITATION | Age: 58
End: 2018-07-09
Attending: PHYSICAL MEDICINE & REHABILITATION
Payer: COMMERCIAL

## 2018-07-09 DIAGNOSIS — M75.41 SHOULDER IMPINGEMENT SYNDROME, RIGHT: ICD-10-CM

## 2018-07-09 DIAGNOSIS — M54.2 NECK PAIN: ICD-10-CM

## 2018-07-09 PROCEDURE — 97110 THERAPEUTIC EXERCISES: CPT

## 2018-07-09 PROCEDURE — 97014 ELECTRIC STIMULATION THERAPY: CPT

## 2018-07-09 NOTE — OP THERAPY DAILY TREATMENT
Outpatient Physical Therapy  DAILY TREATMENT     [facility] Physical [address]  [address]  [place] [address]  Phone:  [phone]  Fax:  [phone]    Date: 07/09/2018    Patient: Tammy Ontiveros  YOB: 1960  MRN: [id number]     Time Calculation  Start time: 0900  Stop time: 1000 Time Calculation (min): 60 minutes     Chief Complaint: Neck Pain and Shoulder Pain    Visit #: 17    SUBJECTIVE:  Pt reports minimal improvement since last visit. Cont to report increased stiffness and pain around upper back and shoulders. Reports decreased inflammation in stomach today, but now actually feels more pain.    OBJECTIVE:      Therapeutic Exercises (CPT 29744):     1. Foam Roller, pec stretch, alt UE OH and lat, angels    2. Prone ski jump over ball, 3x30 sec    3. Nu Step, x10 min    Therapeutic Treatments and Modalities:     1. E Stim Unattended (CPT 67000), IFC and MHP to upper T/S, x15 min    Time-based treatments/modalities:  Therapeutic exercise minutes (CPT 65199): 30 minutes       Pain rating before treatment: 5  Pain rating after treatment: 4-5    ASSESSMENT:   Pt demos fluctuating shoulder function/ROM and continued sxs that now include pain in R shoulder, hand, sternum, T/S, abdomen, and difficulty breathing. Pt has very slow antalgic movements throughout ther ex session.     PLAN/RECOMMENDATIONS:   Cont PT 2-3 sessions to address RC strength, scap stability, T/S mobility.

## 2018-07-11 ENCOUNTER — PHYSICAL THERAPY (OUTPATIENT)
Dept: PHYSICAL THERAPY | Facility: REHABILITATION | Age: 58
End: 2018-07-11
Attending: PHYSICAL MEDICINE & REHABILITATION
Payer: COMMERCIAL

## 2018-07-11 DIAGNOSIS — M75.41 SHOULDER IMPINGEMENT SYNDROME, RIGHT: ICD-10-CM

## 2018-07-11 DIAGNOSIS — M54.2 NECK PAIN: ICD-10-CM

## 2018-07-11 PROCEDURE — 97110 THERAPEUTIC EXERCISES: CPT

## 2018-07-11 NOTE — OP THERAPY DAILY TREATMENT
Outpatient Physical Therapy  DAILY TREATMENT     [facility] Physical [address]  [address]  [place] [address]  Phone:  [phone]  Fax:  [phone]    Date: 07/11/2018    Patient: Tammy Ontiveros  YOB: 1960  MRN: [id number]     Time Calculation  Start time: 0930  Stop time: 1030 Time Calculation (min): 60 minutes     Chief Complaint: Shoulder Pain and Neck Pain    Visit #: 18    SUBJECTIVE:  Pt reports difficulty sleeping last night. States pain goes from top of R shoulder down into R side of ribcage and across to abdomen. Sxs are unchanged by position. Pt does not have time for e-stim, has MD appt immediately afterwards.    OBJECTIVE:      Therapeutic Exercises (CPT 27443):     1. Foam Roller (soft), pec stretch, alt UE OH and lat, angels    2. True Stretch T/S rot, x5 min    3. Nu Step, x5 min    4. Wall ball rollups , x20    5. Rows w/blue TB, x20    6. Pulleys flex and abd, x10 ea      Time-based treatments/modalities:  Therapeutic exercise minutes (CPT 57052): 30 minutes       Pain rating before treatment: 6  Pain rating after treatment: 6    ASSESSMENT:   Pt presented initially w/improved quality, quantity and timing of movement. During session pt demonstrated decline in the above and subj report of pain increasing throughout. Pt's sxs are constant and inconsistent w/typical musculoskeletal healing.    PLAN/RECOMMENDATIONS:   Cont PT 2 sessions to address gross mobility, RC strength, and scap stability.

## 2018-07-15 ENCOUNTER — OCCUPATIONAL MEDICINE (OUTPATIENT)
Dept: URGENT CARE | Facility: CLINIC | Age: 58
End: 2018-07-15
Payer: COMMERCIAL

## 2018-07-15 VITALS
RESPIRATION RATE: 16 BRPM | SYSTOLIC BLOOD PRESSURE: 126 MMHG | HEART RATE: 83 BPM | BODY MASS INDEX: 27.49 KG/M2 | WEIGHT: 165 LBS | OXYGEN SATURATION: 94 % | DIASTOLIC BLOOD PRESSURE: 78 MMHG | TEMPERATURE: 98 F | HEIGHT: 65 IN

## 2018-07-15 DIAGNOSIS — S46.912A STRAIN OF LEFT SHOULDER, INITIAL ENCOUNTER: Primary | ICD-10-CM

## 2018-07-15 LAB
AMP AMPHETAMINE: NORMAL
BAR BARBITURATES: NORMAL
BREATH ALCOHOL COMMENT: NORMAL
BZO BENZODIAZEPINES: NORMAL
COC COCAINE: NORMAL
INT CON NEG: NORMAL
INT CON POS: NORMAL
MDMA ECSTASY: NORMAL
MET METHAMPHETAMINES: NORMAL
MTD METHADONE: NORMAL
OPI OPIATES: NORMAL
OXY OXYCODONE: NORMAL
PCP PHENCYCLIDINE: NORMAL
POC BREATHALIZER: 0 PERCENT (ref 0–0.01)
POC URINE DRUG SCREEN OCDRS: NORMAL
THC: NORMAL

## 2018-07-15 PROCEDURE — 82075 ASSAY OF BREATH ETHANOL: CPT | Mod: 29 | Performed by: PHYSICIAN ASSISTANT

## 2018-07-15 PROCEDURE — 99203 OFFICE O/P NEW LOW 30 MIN: CPT | Mod: 29 | Performed by: PHYSICIAN ASSISTANT

## 2018-07-15 PROCEDURE — 80305 DRUG TEST PRSMV DIR OPT OBS: CPT | Mod: 29 | Performed by: PHYSICIAN ASSISTANT

## 2018-07-15 RX ORDER — BENZONATATE 200 MG/1
200 CAPSULE ORAL 3 TIMES DAILY PRN
COMMUNITY

## 2018-07-15 ASSESSMENT — ENCOUNTER SYMPTOMS
SHORTNESS OF BREATH: 0
PALPITATIONS: 0
TINGLING: 0
SENSORY CHANGE: 0
VOMITING: 0
CHILLS: 0
FOCAL WEAKNESS: 0
COUGH: 0
FEVER: 0
NAUSEA: 0
MYALGIAS: 1

## 2018-07-15 ASSESSMENT — PAIN SCALES - GENERAL: PAINLEVEL: 8=MODERATE-SEVERE PAIN

## 2018-07-15 NOTE — LETTER
"EMPLOYEE’S CLAIM FOR COMPENSATION/ REPORT OF INITIAL TREATMENT  FORM C-4    EMPLOYEE’S CLAIM - PROVIDE ALL INFORMATION REQUESTED   First Name  Tammy Last Name  Weston Birthdate                    1960                Sex  female Claim Number   Home Address  [address] Age  58 y.o. 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 (Be specific an answer in detail. Use additional sheet if necessary)  I was mopping the floor and felt a sharppain from my neck to shoulder/ back and through to chest   If you believe that you have an occupational disease, when did you first have knowledge of the disability and it relationship to your employment?  n/a Witnesses to the Accident  Doretha Kel       Nature of Injury or Occupational Disease  Strain  Part(s) of Body Injured or Affected  Shoulder (L), Shoulder (R), Chest    I certify that the above is true and correct to the best of my knowledge and that I have provided this information in order to obtain the benefits of [place]’s " [place]).  I hereby authorize any physician, chiropractor, surgeon, practitioner, or other person, any hospital, including [facility] or [facility], any medical service organization, any insurance company, or other institution or organization to release to each other, any medical or other information, including benefits paid or payable, pertinent to this injury or disease, except information relative to diagnosis, treatment and/or counseling for AIDS, psychological conditions, alcohol or controlled substances, for which I must give specific authorization.  A Photostat of this authorization shall be as valid as the original.     Date   Place   Employee’s Signature   THIS REPORT MUST BE COMPLETED AND MAILED WITHIN 3 WORKING DAYS OF TREATMENT   Place  [facility]  Name of [facility]   Date  7/15/2018 Diagnosis  (S46.912A) Strain of left shoulder, initial encounter Is there evidence the injured employee was under the influence of alcohol and/or another controlled substance at the time of accident?   Hour  3:51 PM Description of Injury or Disease  The encounter diagnosis was Strain of left shoulder, initial encounter. No   Treatment  RICE, heat, OTC Ibuprofen or tylenol  Have you advised the patient to remain off work five days or more? No   X-Ray Findings      If Yes   From Date  To Date      From information given by the employee, together with medical evidence, can you directly connect this injury or occupational disease as job incurred?  Yes If No Full Duty  No Modified Duty  Yes   Is additional medical care by a physician indicated?  Yes If Modified Duty, Specify any Limitations / Restrictions  See D-39 for restrictions   Do you know of any previous injury or disease contributing to this condition or occupational disease?                            Yes  Comments:patient currently  "being treated for right shoulder injury under workmen's comp   Date  7/15/2018 Print Doctor’s Name Aldo Peña P.A.-C. I certify the employer’s copy of  this form was mailed on:   Address  [address] Only     [place] Zip  [address]    Provider’s Tax ID Number  [id number] Telephone  Dept: [phone]        e-ALDO Luke P.A.-C.   e-Signature: Dr. Rob Akbar, Medical Director Degree  DAMION        ORIGINAL-TREATING PHYSICIAN OR CHIROPRACTOR    PAGE 2-INSURER/TPA    PAGE 3-EMPLOYER    PAGE 4-EMPLOYEE             Form C-4 (rev10/07)              BRIEF DESCRIPTION OF RIGHTS AND BENEFITS  (Pursuant to [place]RS 616C.050)    Notice of Injury or Occupational Disease (Incident Report Form C-1): If an injury or occupational disease (OD) arises out of and in the  course of employment, you must provide written notice to your employer as soon as practicable, but no later than 7 days after the accident or  OD. Your employer shall maintain a sufficient supply of the required forms.    Claim for Compensation (Form C-4): If medical treatment is sought, the form C-4 is available at the place of initial treatment. A completed  \"Claim for Compensation\" (Form C-4) must be filed within 90 days after an accident or OD. The treating physician or chiropractor must,  within 3 working days after treatment, complete and mail to the employer, the employer's insurer and third-party , the Claim for  Compensation.    Medical Treatment: If you require medical treatment for your on-the-job injury or OD, you may be required to select a physician or  chiropractor from a list provided by your workers’ compensation insurer, if it has contracted with an Organization for Managed Care (MCO) or  Preferred Provider Organization (PPO) or providers of health care. If your employer has not entered into a contract with an MCO or PPO, you  may select a physician or chiropractor from the Panel of " Physicians and Chiropractors. Any medical costs related to your industrial injury or  OD will be paid by your insurer.    Temporary Total Disability (TTD): If your doctor has certified that you are unable to work for a period of at least 5 consecutive days, or 5  cumulative days in a 20-day period, or places restrictions on you that your employer does not accommodate, you may be entitled to TTD  compensation.    Temporary Partial Disability (TPD): If the wage you receive upon reemployment is less than the compensation for TTD to which you are  entitled, the insurer may be required to pay you TPD compensation to make up the difference. TPD can only be paid for a maximum of 24  months.    Permanent Partial Disability (PPD): When your medical condition is stable and there is an indication of a PPD as a result of your injury or  OD, within 30 days, your insurer must arrange for an evaluation by a rating physician or chiropractor to determine the degree of your PPD. The  amount of your PPD award depends on the date of injury, the results of the PPD evaluation and your age and wage.    Permanent Total Disability (PTD): If you are medically certified by a treating physician or chiropractor as permanently and totally disabled  and have been granted a PTD status by your insurer, you are entitled to receive monthly benefits not to exceed 66 2/3% of your average  monthly wage. The amount of your PTD payments is subject to reduction if you previously received a PPD award.    Vocational Rehabilitation Services: You may be eligible for vocational rehabilitation services if you are unable to return to the job due to a  permanent physical impairment or permanent restrictions as a result of your injury or occupational disease.    Transportation and Per Britany Reimbursement: You may be eligible for travel expenses and per britany associated with medical treatment.    Reopening: You may be able to reopen your claim if your condition  worsens after claim closure.    Appeal Process: If you disagree with a written determination issued by the insurer or the insurer does not respond to your request, you may  appeal to the Department of Administration, , by following the instructions contained in your determination letter. You must  appeal the determination within 70 days from the date of the determination letter at [address], [address], [place], [place]  [address], or [address], [place], [place] [address]. If you disagree with the  decision, you may appeal to the  Department of Administration, . You must file your appeal within 30 days from the date of the  decision  letter at [address], [address], [place], [place] [address], or [address], [place], [place] [address]. If you  disagree with a decision of an , you may file a petition for judicial review with the District Court. You must do so within 30  days of the Appeal Officer’s decision. You may be represented by an  at your own expense or you may contact the [facility] for possible  representation.    [place]  for Injured Workers (NAIW): If you disagree with a  decision, you may request that NAIW represent you  without charge at an  Hearing. For information regarding denial of benefits, you may contact the [facility] at: [address], [place] [address], [phone], or [address], [place] [address], [phone]    To File a Complaint with the Division: If you wish to file a complaint with the  of the Division of Industrial Relations (DIR),  please contact the Workers’ Compensation Section, [address], [address], [place], [place] [address], telephone [phone], or  [address], [place] [address], telephone [phone])  [phone].    For assistance with Workers’ Compensation Issues: you may contact the Office of the Governor Consumer Health Assistance, [address] [place] [address], [address], [place] [address], Toll Free 1-440.333.4978, Web site: http://govcha.[facility].[place]., E-mail  [email or web address].[place].                                                                                                                                                                                                                                   __________________________________________________________________                                                                   _________________                Employee Name / Signature                                                                                                                                                       Date                                                                                                                                                                                                     D-2 (rev. 10/07)

## 2018-07-15 NOTE — LETTER
[address] [place] [address]  Phone:  [phone] - Fax:  [phone]   [facility] Progress Report and Disability Certification  Date of Service: 7/15/2018   No Show:  No  Date / Time of Next Visit: 2018@2:30pm   Claim Information   Patient Name: Tammy Ontiveros  Claim Number:     Employer: [employer]  Date of Injury: 7/15/2018     Insurer / TPA: Workers Choice  ID / SSN:     Occupation: ABS  Diagnosis: The encounter diagnosis was Strain of left shoulder, initial encounter.    Medical Information   Related to Industrial Injury?      Subjective Complaints:  DOI: 7/15/18. 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 She now complaints of 8/10 pain in her left shoulder. She is having difficulty raising her shoulder above shoulder height. She denies numbness or tingling. She denies previous injury in her left shoulder. Patient states she is currently under treatment for a right shoulder injury and has been using her left shoulder more to avoid hurting her right shoulder further.   Objective Findings: Vitals reviewed.  Left shoulder. Limited ROM with abduction past shoulder height due to pain. Moderate TTP over anterior and posterior shoulder. Pain in left upper trapezius muscle with palpation.  strength 5/5. Distal n/v intact. Unable to perform empty can test due to  Pain.   Pre-Existing Condition(s):     Assessment:   Initial Visit    Status: Additional Care Required  Permanent Disability:     Plan: Medication  Comments:RICE, OTC NSAIDS, heat     Diagnostics:      Comments:       Disability Information   Status: Released to Restricted Duty    From:  7/15/2018  Through: 2018 Restrictions are: Temporary   Physical Restrictions   Sitting:    Standing:    Stooping:    Bending:      Squatting:    Walking:    Climbing:    Pushin hrs/day   Pullin  hrs/day Other:    Reaching Above Shoulder (L): 0 hrs/day Reaching Above Shoulder (R):       Reaching Below Shoulder (L):    Reaching Below Shoulder (R):      Not to exceed Weight Limits   Carrying(hrs):   Weight Limit(lb): < or = to 10 pounds Lifting(hrs):   Weight  Limit(lb): < or = to 10 pounds   Comments: Restrictions to left arm- See other worker's comp restrictions regarding right arm    Repetitive Actions   Hands: i.e. Fine Manipulations from Grasping:     Feet: i.e. Operating Foot Controls:     Driving / Operate Machinery:     Physician Name: Aldo Peña P.A.-C. Physician Signature: ALDO Marino P.A.-C. e-Signature: Dr. Rob Akbar, Medical Director   Clinic Name / Location: [address] [address] Clinic Phone Number: Dept: [phone]   Appointment Time: 2:30 Pm Visit Start Time: 3:51 PM   Check-In Time:  3:24 Pm Visit Discharge Time:  4:32pm   Original-Treating Physician or Chiropractor    Page 2-Insurer/TPA    Page 3-Employer    Page 4-Employee

## 2018-07-15 NOTE — PROGRESS NOTES
"Subjective:      Tammy Ontiveros is a 58 y.o. female who presents with Shoulder Injury (Lt shoulder muscle pulled while mopping. Hurts when there is movment doi 7/15)      DOI: 7/15/18. 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 She now complaints of 8/10 pain in her left shoulder. She is having difficulty raising her shoulder above shoulder height. She denies numbness or tingling. She denies previous injury in her left shoulder. Patient states she is currently under treatment for a right shoulder injury and has been using her left shoulder more to avoid hurting her right shoulder further.     Shoulder Injury    Pertinent negatives include no chest pain or tingling.     History reviewed. No pertinent past medical history.    History reviewed. No pertinent surgical history.    History reviewed. No pertinent family history.    No Known Allergies    Medications, Allergies, and current problem list reviewed today in Epic      Review of Systems   Constitutional: Negative for chills, fever and malaise/fatigue.   Respiratory: Negative for cough and shortness of breath.    Cardiovascular: Negative for chest pain, palpitations and leg swelling.   Gastrointestinal: Negative for nausea and vomiting.   Musculoskeletal: Positive for joint pain (left shoulder pain ) and myalgias (left neck, left chest).   Neurological: Negative for tingling, sensory change and focal weakness.     All other systems reviewed and are negative.        Objective:     /78   Pulse 83   Temp 36.7 °C (98 °F)   Resp 16   Ht 1.651 m (5' 5\")   Wt 74.8 kg (165 lb)   SpO2 94%   BMI 27.46 kg/m²      Physical Exam   Constitutional: She is oriented to person, place, and time. She appears well-developed and well-nourished. No distress.   Pulmonary/Chest: Effort normal. No respiratory distress.   Neurological: She is alert and oriented to person, place, and time. No " cranial nerve deficit.   Psychiatric: She has a normal mood and affect. Her behavior is normal. Judgment and thought content normal.       Vitals reviewed.  Left shoulder. Limited ROM with abduction past shoulder height due to pain. Moderate TTP over anterior and posterior shoulder. Pain in left upper trapezius muscle with palpation.  strength 5/5. Distal n/v intact. Unable to perform empty can test due to  Pain.       Assessment/Plan:     1. Strain of left shoulder, initial encounter    RICE  - OTC NSAIDS or Tylenol  - Restrictions per D-39.  Follow-up with [facility] in 3-4 days     Differential diagnoses, Supportive care, and indications for immediate follow-up discussed with patient.   Instructed to return to clinic or nearest emergency department for any change in condition, further concerns, or worsening of symptoms.    The patient demonstrated a good understanding and agreed with the treatment plan.    Ailyn Peña P.A.-C.

## 2018-07-18 ENCOUNTER — OCCUPATIONAL MEDICINE (OUTPATIENT)
Dept: OCCUPATIONAL MEDICINE | Facility: CLINIC | Age: 58
End: 2018-07-18
Payer: COMMERCIAL

## 2018-07-18 ENCOUNTER — PHYSICAL THERAPY (OUTPATIENT)
Dept: PHYSICAL THERAPY | Facility: REHABILITATION | Age: 58
End: 2018-07-18
Attending: PHYSICAL MEDICINE & REHABILITATION
Payer: COMMERCIAL

## 2018-07-18 ENCOUNTER — APPOINTMENT (OUTPATIENT)
Dept: RADIOLOGY | Facility: IMAGING CENTER | Age: 58
End: 2018-07-18
Attending: PREVENTIVE MEDICINE
Payer: COMMERCIAL

## 2018-07-18 VITALS
DIASTOLIC BLOOD PRESSURE: 80 MMHG | OXYGEN SATURATION: 94 % | SYSTOLIC BLOOD PRESSURE: 132 MMHG | BODY MASS INDEX: 27.49 KG/M2 | HEIGHT: 65 IN | WEIGHT: 165 LBS | HEART RATE: 100 BPM | RESPIRATION RATE: 16 BRPM

## 2018-07-18 DIAGNOSIS — M75.41 SHOULDER IMPINGEMENT SYNDROME, RIGHT: ICD-10-CM

## 2018-07-18 DIAGNOSIS — M25.512 LEFT SHOULDER PAIN, UNSPECIFIED CHRONICITY: ICD-10-CM

## 2018-07-18 DIAGNOSIS — M54.2 CERVICALGIA: ICD-10-CM

## 2018-07-18 DIAGNOSIS — M54.2 NECK PAIN: ICD-10-CM

## 2018-07-18 PROCEDURE — 97014 ELECTRIC STIMULATION THERAPY: CPT

## 2018-07-18 PROCEDURE — 99214 OFFICE O/P EST MOD 30 MIN: CPT | Performed by: PREVENTIVE MEDICINE

## 2018-07-18 PROCEDURE — 73030 X-RAY EXAM OF SHOULDER: CPT | Mod: TC,LT,29 | Performed by: PHYSICIAN ASSISTANT

## 2018-07-18 PROCEDURE — 97110 THERAPEUTIC EXERCISES: CPT

## 2018-07-18 PROCEDURE — 71045 X-RAY EXAM CHEST 1 VIEW: CPT | Mod: 26,29 | Performed by: PHYSICIAN ASSISTANT

## 2018-07-18 RX ORDER — CYCLOBENZAPRINE HCL 10 MG
10 TABLET ORAL
Qty: 30 TAB | Refills: 0 | Status: SHIPPED | OUTPATIENT
Start: 2018-07-18

## 2018-07-18 RX ORDER — PREDNISONE 20 MG/1
40 TABLET ORAL DAILY
Qty: 14 TAB | Refills: 0 | Status: SHIPPED | OUTPATIENT
Start: 2018-07-18 | End: 2018-07-25

## 2018-07-18 ASSESSMENT — ENCOUNTER SYMPTOMS
BACK PAIN: 1
NECK PAIN: 1
ABDOMINAL PAIN: 1
TINGLING: 1
SENSORY CHANGE: 1
FOCAL WEAKNESS: 1
BLOOD IN STOOL: 1

## 2018-07-18 ASSESSMENT — PAIN SCALES - GENERAL: PAINLEVEL: 5=MODERATE PAIN

## 2018-07-18 NOTE — OP THERAPY DISCHARGE SUMMARY
Outpatient Physical Therapy  DISCHARGE SUMMARY NOTE      [facility] [address]  [address]  [place] [address]  Phone:  [phone]  Fax:  [phone]    Date of Visit: 07/18/2018    Patient: Tammy Ontiveros  YOB: 1960  MRN: [id number]     Referring Provider: Noah Lyle M.D.  [address] [facility] [address]  [address]  [place] [address]   Referring Diagnosis Other sprain of right shoulder joint, initial encounter [S43.491A];Sprain of ligaments of cervical spine, subsequent encounter [S13.4XXD]     Physical Therapy Occurrence Codes    Date of onset of impairment:  1/30/18   Date physical therapy care plan established or reviewed:  3/29/18   Date physical therapy treatment started:  3/29/18            Your patient is being discharged from Physical Therapy with the following comments:   · Goals not met    Comments: (per last daily note)  Pt has rec'd physical therapy over the last 5 months including ther ex, manual rx, neuro re-ed, and modalities with no subjective or objective improvement. Pt also w/additional concerns that are seemingly unrelated to original work injury that are not appropriate for physical therapy regardless. As of this week there is also new injury w/L UE. PT has not been able to address pt's sxs or function as would be expected and within a reasonable timeframe.     Recommendations:  D/C    Bing Siddiqui, PT, DPT    Date: 7/18/2018

## 2018-07-18 NOTE — OP THERAPY DAILY TREATMENT
Outpatient Physical Therapy  DAILY TREATMENT     [facility] [address]  [address]  [place] [address]  Phone:  [phone]  Fax:  [phone]    Date: 07/18/2018    Patient: Tammy Ontiveros  YOB: 1960  MRN: [id number]     Time Calculation  Start time: 0930  Stop time: 1015 Time Calculation (min): 45 minutes     Chief Complaint: Neck Pain and Shoulder Pain    Visit #: Visit count could not be calculated. Make sure you are using a visit which is associated with an episode.    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 Pt went to urgent care/[facility]. Pt reports L shoulder pain is not improving and continues to report worsening R shoulder/neck pain, abdominal swelling and discomfort and difficulty breathing when lying down or w/pressure over sternum.    OBJECTIVE:      Therapeutic Exercises (CPT 65745):     1. Nu Step, x10 min    2. Reviewed HEP, RC strength w/TB, scap stab w/TB, foam roller, ball exs, deep c/s stab exs    Therapeutic Treatments and Modalities:     1. E Stim Unattended (CPT 19476), IFC and MHP to neck/shoulder bilaterally, x15 min    Time-based treatments/modalities:  Therapeutic exercise minutes (CPT 68032): 30 minutes       ASSESSMENT:   Pt has rec'd physical therapy over the last 5 months including ther ex, manual rx, neuro re-ed, and modalities with no subjective or objective improvement. Pt also w/additional concerns that are seemingly unrelated to original work injury that are not appropriate for physical therapy regardless. As of this week there is also new injury w/L UE.     PLAN/RECOMMENDATIONS:   D/C. Pt has been given HEP to address R shoulder/RC strengthening, scapular and c/s stability. Pt may require additional work up or pain management to address ongoing and varied sxs.

## 2018-07-18 NOTE — PROGRESS NOTES
"Subjective:      Tammy Ontiveros is a 58 y.o. female who presents with Other      DOI 7/15/2018: 58 female well-known to occupational health.yo she states she was mopping the floor and felt a sudden pain in her neck and left shoulder and left chest.  She was seen in urgent care who advised NSAIDs.  She states the pain continues to be the same.  The pain is severe, debilitating and she is unable to sleep due to the pain.  The pain is present  throughout her entire shoulder, upper trapezius, neck and chest wall.  She states all these areas hurt when she moves her arm.  On a side note she notes continued pain of her right shoulder and right hand unchanged from 3 months ago.  She states she was released to full duty recently before having this injury.  She has been attending physical therapy for her neck, right shoulder and right hand.  It is completed over 22 sessions of physical therapy in the last 5 months.  She states she is currently not taking any medications for these conditions.  She also notes that over the last month or 2 she has had bloody stools, right hip pain and the feeling of swelling in her limbs.     HPI    Review of Systems   Constitutional: Positive for malaise/fatigue.   Gastrointestinal: Positive for abdominal pain and blood in stool.   Musculoskeletal: Positive for back pain, joint pain and neck pain.   Skin: Negative for itching and rash.   Neurological: Positive for tingling, sensory change and focal weakness.     SOCHX: Works as a  at [employer]   FH: No pertinent family history to this problem.     Objective:     /80   Pulse 100   Resp 16   Ht 1.651 m (5' 5\")   Wt 74.8 kg (165 lb)   SpO2 94%   BMI 27.46 kg/m²      Physical Exam   Constitutional: She is oriented to person, place, and time. She appears well-developed and well-nourished.   Cardiovascular: Normal rate.    Pulmonary/Chest: Effort normal.   Neurological: She is alert and oriented to person, place, and time. " "  Skin: Skin is warm and dry.   Psychiatric: Her speech is normal.       Left shoulder: No gross deformity.  Diffuse tenderness to light touch from lateral chest wall, entire shoulder, upper trapezius and bilateral paraspinal musculature.  Essentially full range of motion with grimacing.  Poor effort on strength testing.  Reflexes and sensation intact.       Assessment/Plan:     1. Left shoulder pain, unspecified chronicity  - DX-SHOULDER 2+ LEFT; Future  - DX-CHEST-LIMITED (1 VIEW); Future  - REFERRAL TO RADIOLOGY  - REFERRAL TO ORTHOPEDICS  - predniSONE (DELTASONE) 20 MG Tab; Take 2 Tabs by mouth every day for 7 days.  Dispense: 14 Tab; Refill: 0  - cyclobenzaprine (FLEXERIL) 10 MG Tab; Take 1 Tab by mouth at bedtime as needed.  Dispense: 30 Tab; Refill: 0  - MR-SHOULDER-W/O LEFT; Future    2. Cervicalgia  - REFERRAL TO ORTHOPEDICS  - predniSONE (DELTASONE) 20 MG Tab; Take 2 Tabs by mouth every day for 7 days.  Dispense: 14 Tab; Refill: 0  - cyclobenzaprine (FLEXERIL) 10 MG Tab; Take 1 Tab by mouth at bedtime as needed.  Dispense: 30 Tab; Refill: 0    XR left shoulder: AC joint arthritis.  No acute findings   XR chest: No acute deformity   Prescribe prednisone and Flexeril   Given prior patient history of delayed recovery and to facilitate claim closure will refer for MRI left shoulder and orthopedic consultation   Advised to seek care with PCP regarding occasional bowel issues   Restricted duty   Follow-up 4 weeks     Patient with long history of multi-body part complaints from prior injury, complaints from prior injury included bilateral shoulders, neck, bilateral knees and bilateral hands and also included diffuse \"swelling\" of her abdomen.  Given this history and with minor mechanism of injury with severe symptoms the work relatedness of his claim is dubious at best.    "

## 2018-07-18 NOTE — LETTER
July 18, 2018    No Recipients      Re:  Tammy Ontiveros          Dear  No Recipients,    It was a pleasure seeing your patient, Tammy Ontiveros, on 7/18/2018 for her final therapy visit.     Please find enclosed a copy of the patient's discharge summary from outpatient physical therapy services.          On behalf of the staff at [facility], we would like to thank you for your referrals.  We appreciate your confidence in our clinic and will continue to work hard to provide the best outcomes for your patients.    We sincerely enjoy treating your patients and hope that you have been happy with their care.  Thank you again, and please call with any questions, concerns or ways that we can best meet your needs.        Sincerely,          Bing Siddiqui, PT, DPT    No Recipients              Outpatient Physical Therapy  DISCHARGE SUMMARY NOTE      [address]  [address]  [facility] [address]  Phone:  [phone]  Fax:  [phone]    Date of Visit: 07/18/2018    Patient: Tammy Ontiveros  YOB: 1960  MRN: [id number]     Referring Provider: Noah Lyle M.D.  [address] [facility] [address]  [address]  [place] [address]   Referring Diagnosis Other sprain of right shoulder joint, initial encounter [S43.491A];Sprain of ligaments of cervical spine, subsequent encounter [S13.4XXD]     Physical Therapy Occurrence Codes    Date of onset of impairment:  1/30/18   Date physical therapy care plan established or reviewed:  3/29/18   Date physical therapy treatment started:  3/29/18            Your patient is being discharged from Physical Therapy with the following comments:   · Goals not met    Comments: (per last daily note)  Pt has rec'd physical therapy over the last 5 months including ther ex, manual rx, neuro re-ed, and modalities with no subjective or objective improvement. Pt also w/additional concerns that are seemingly unrelated to original work  injury that are not appropriate for physical therapy regardless. As of this week there is also new injury w/L UE. PT has not been able to address pt's sxs or function as would be expected and within a reasonable timeframe.     Recommendations:  D/C    Bing Siddiqui, PT, DPT    Date: 7/18/2018

## 2018-07-18 NOTE — LETTER
[address],   [address] [place] [address]  Phone:  [phone] - Fax:  [phone]   Occupational Health [facility] Progress Report and Disability Certification  Date of Service: 7/18/2018   No Show:  No  Date / Time of Next Visit: 8/21/2018 @ 4:40 PM    Claim Information   Patient Name: Tammy Ontiveros  Claim Number:     Employer: [employer]  Date of Injury: 7/15/2018     Insurer / TPA: [employer]  ID / SSN:     Occupation: ABS  Diagnosis: Diagnoses of Left shoulder pain, unspecified chronicity and Cervicalgia were pertinent to this visit.    Medical Information   Related to Industrial Injury? No    Subjective Complaints:  DOI 7/15/2018: 58 female well-known to occupational health.yo she states she was mopping the floor and felt a sudden pain in her neck and left shoulder and left chest.  She was seen in urgent care who advised NSAIDs.  She states the pain continues to be the same.  The pain is severe, debilitating and she is unable to sleep due to the pain.  The pain is present  throughout her entire shoulder, upper trapezius, neck and chest wall.  She states all these areas hurt when she moves her arm.  On a side note she notes continued pain of her right shoulder and right hand unchanged from 3 months ago.  She states she was released to full duty recently before having this injury.  She has been attending physical therapy for her neck, right shoulder and right hand.  It is completed over 22 sessions of physical therapy in the last 5 months.  She states she is currently not taking any medications for these conditions.  She also notes that over the last month or 2 she has had bloody stools, right hip pain and the feeling of swelling in her limbs.   Objective Findings: Left shoulder: No gross deformity.  Diffuse tenderness to light touch from lateral chest wall, entire shoulder, upper trapezius and bilateral paraspinal musculature.  Essentially full  range of motion with grimacing.  Poor effort on strength testing.  Reflexes and sensation intact.   Pre-Existing Condition(s):     Assessment:   Condition Same    Status: Additional Care Required  Permanent Disability:No    Plan:      Diagnostics:      Comments:  XR left shoulder: AC joint arthritis.  No acute findings  XR chest: No acute deformity  Prescribe prednisone and Flexeril  Given prior patient history of delayed recovery will refer for MRI left shoulder and orthopedic consultation  Advised to seek c  are with PCP regarding occasional bowel issues  Restricted duty  Follow-up 4 weeks    Disability Information   Status: Released to Restricted Duty    From:  7/18/2018  Through: 8/21/2018 Restrictions are: Temporary   Physical Restrictions   Sitting:    Standing:    Stooping:    Bending:      Squatting:    Walking:    Climbing:    Pushing:      Pulling:    Other:    Reaching Above Shoulder (L):   Reaching Above Shoulder (R):       Reaching Below Shoulder (L):    Reaching Below Shoulder (R):      Not to exceed Weight Limits   Carrying(hrs):   Weight Limit(lb):   Lifting(hrs):   Weight  Limit(lb):     Comments: Limit left arm to less than 10 pounds lift/push/pull.  Limited but the shoulder movements of the left arm.    Repetitive Actions   Hands: i.e. Fine Manipulations from Grasping:     Feet: i.e. Operating Foot Controls:     Driving / Operate Machinery:     Physician Name: Igor Toro D.O. Physician Signature: IGOR Moore D.O. e-Signature: Dr. Rob Akbar, Medical Director   Clinic Name / Location: [address],   [address] [place] [address] Clinic Phone Number: Dept: [phone]   Appointment Time: 2:30 Pm Visit Start Time: 2:36 PM   Check-In Time:  2:24 Pm Visit Discharge Time:  4:10 PM    Original-Treating Physician or Chiropractor    Page 2-Insurer/TPA    Page 3-Employer    Page 4-Employee

## 2018-07-20 ENCOUNTER — APPOINTMENT (OUTPATIENT)
Dept: PHYSICAL THERAPY | Facility: REHABILITATION | Age: 58
End: 2018-07-20
Attending: PHYSICAL MEDICINE & REHABILITATION
Payer: COMMERCIAL

## 2018-10-08 ENCOUNTER — HOSPITAL ENCOUNTER (OUTPATIENT)
Dept: LAB | Facility: MEDICAL CENTER | Age: 58
End: 2018-10-08
Attending: INTERNAL MEDICINE
Payer: COMMERCIAL

## 2018-10-08 PROCEDURE — 83516 IMMUNOASSAY NONANTIBODY: CPT

## 2018-10-08 PROCEDURE — 82784 ASSAY IGA/IGD/IGG/IGM EACH: CPT

## 2018-10-08 PROCEDURE — 36415 COLL VENOUS BLD VENIPUNCTURE: CPT

## 2018-10-10 LAB
IGA SERPL-MCNC: 309 MG/DL (ref 68–408)
TTG IGA SER IA-ACNC: 1 U/ML (ref 0–3)

## 2018-10-31 ENCOUNTER — APPOINTMENT (OUTPATIENT)
Dept: CARDIOLOGY | Facility: MEDICAL CENTER | Age: 58
End: 2018-10-31
Payer: COMMERCIAL

## 2018-12-03 ENCOUNTER — HOSPITAL ENCOUNTER (OUTPATIENT)
Dept: RADIOLOGY | Facility: MEDICAL CENTER | Age: 58
End: 2018-12-03
Attending: PHYSICAL MEDICINE & REHABILITATION
Payer: COMMERCIAL

## 2018-12-03 DIAGNOSIS — M54.6 PAIN IN THORACIC SPINE: ICD-10-CM

## 2018-12-03 PROCEDURE — 71250 CT THORAX DX C-: CPT

## 2019-06-11 ENCOUNTER — OFFICE VISIT (OUTPATIENT)
Dept: URGENT CARE | Facility: CLINIC | Age: 59
End: 2019-06-11
Payer: COMMERCIAL

## 2019-06-11 VITALS
HEIGHT: 65 IN | TEMPERATURE: 97.9 F | RESPIRATION RATE: 16 BRPM | SYSTOLIC BLOOD PRESSURE: 126 MMHG | DIASTOLIC BLOOD PRESSURE: 82 MMHG | BODY MASS INDEX: 27.49 KG/M2 | OXYGEN SATURATION: 96 % | WEIGHT: 165 LBS | HEART RATE: 92 BPM

## 2019-06-11 DIAGNOSIS — H65.01 RIGHT ACUTE SEROUS OTITIS MEDIA, RECURRENCE NOT SPECIFIED: ICD-10-CM

## 2019-06-11 PROCEDURE — 99214 OFFICE O/P EST MOD 30 MIN: CPT | Performed by: PHYSICIAN ASSISTANT

## 2019-06-11 RX ORDER — AZITHROMYCIN 250 MG/1
TABLET, FILM COATED ORAL
Qty: 6 TAB | Refills: 0 | Status: SHIPPED | OUTPATIENT
Start: 2019-06-11

## 2019-06-11 RX ORDER — FLUTICASONE PROPIONATE 50 MCG
1 SPRAY, SUSPENSION (ML) NASAL DAILY
Qty: 16 G | Refills: 0 | Status: SHIPPED | OUTPATIENT
Start: 2019-06-11

## 2019-06-11 ASSESSMENT — ENCOUNTER SYMPTOMS
VOMITING: 0
DIARRHEA: 0
FEVER: 0
COUGH: 0
CARDIOVASCULAR NEGATIVE: 1
WHEEZING: 0
SORE THROAT: 0
CHILLS: 0
MUSCULOSKELETAL NEGATIVE: 1
HEADACHES: 0
RHINORRHEA: 1
DIZZINESS: 1
SINUS PAIN: 0
SHORTNESS OF BREATH: 0

## 2019-06-11 NOTE — LETTER
June 11, 2019         Patient: Tammy Ontiveros   YOB: 1960   Date of Visit: 6/11/2019           To Whom it May Concern:    Tammy Ontiveros was seen in my clinic on 6/11/2019. She may return to work on Friday June 14th.    If you have any questions or concerns, please don't hesitate to call.        Sincerely,           Prosper Connolly P.A.-C.  Electronically Signed

## 2019-06-12 NOTE — PROGRESS NOTES
Subjective:      Tammy Ontiveros is a 59 y.o. female who presents with Ear Fullness (on and off x6 months, fullness in right ear, is making her dizzy)            Otalgia    There is pain in the right ear. This is a new problem. The current episode started in the past 7 days. The problem occurs constantly. The problem has been unchanged. There has been no fever. The fever has been present for less than 1 day. Associated symptoms include hearing loss and rhinorrhea. Pertinent negatives include no coughing, diarrhea, headaches, sore throat or vomiting. She has tried nothing for the symptoms. The treatment provided no relief. There is no history of a chronic ear infection or hearing loss.       PMH:  has no past medical history on file.  MEDS:   Current Outpatient Prescriptions:   •  azithromycin (ZITHROMAX) 250 MG Tab, Z-margarito, Use as directed., Disp: 6 Tab, Rfl: 0  •  fluticasone (FLONASE) 50 MCG/ACT nasal spray, Spray 1 Spray in nose every day., Disp: 16 g, Rfl: 0  •  cyclobenzaprine (FLEXERIL) 10 MG Tab, Take 1 Tab by mouth at bedtime as needed. (Patient not taking: Reported on 6/11/2019), Disp: 30 Tab, Rfl: 0  •  benzonatate (TESSALON) 200 MG capsule, Take 200 mg by mouth 3 times a day as needed for Cough., Disp: , Rfl:   •  etodolac (LODINE) 400 MG tablet, Take 1 Tab by mouth 2 times a day. (Patient not taking: Reported on 6/11/2019), Disp: 60 Tab, Rfl: 1  •  tizanidine (ZANAFLEX) 4 MG Tab, Take 1 Tab by mouth every 6 hours as needed. (Patient not taking: Reported on 6/11/2019), Disp: 30 Tab, Rfl: 3  •  diclofenac EC (VOLTAREN) 75 MG Tablet Delayed Response, Take 1 Tab by mouth 2 times a day. (Patient not taking: Reported on 6/11/2019), Disp: 60 Tab, Rfl: 1  ALLERGIES: No Known Allergies  SURGHX: History reviewed. No pertinent surgical history.  SOCHX:  reports that she has never smoked. She has never used smokeless tobacco. She reports that she does not drink alcohol or use drugs.  FH: family history is not on  "file.      Review of Systems   Constitutional: Negative for chills and fever.   HENT: Positive for ear pain, hearing loss and rhinorrhea. Negative for congestion, sinus pain and sore throat.    Respiratory: Negative for cough, shortness of breath and wheezing.    Cardiovascular: Negative.    Gastrointestinal: Negative for diarrhea and vomiting.   Musculoskeletal: Negative.    Neurological: Positive for dizziness. Negative for headaches.       Medications, Allergies, and current problem list reviewed today in Epic     Objective:     /82   Pulse 92   Temp 36.6 °C (97.9 °F)   Resp 16   Ht 1.651 m (5' 5\")   Wt 74.8 kg (165 lb)   SpO2 96%   BMI 27.46 kg/m²      Physical Exam   Constitutional: She is oriented to person, place, and time. She appears well-developed and well-nourished. No distress.   HENT:   Head: Normocephalic and atraumatic.   Right Ear: External ear normal. Tympanic membrane is erythematous and bulging. Decreased hearing is noted.   Left Ear: Hearing, tympanic membrane, external ear and ear canal normal.   Nose: Nose normal.   Mouth/Throat: Oropharynx is clear and moist. No oropharyngeal exudate.   Eyes: Pupils are equal, round, and reactive to light. Conjunctivae and EOM are normal. Right eye exhibits no discharge. Left eye exhibits no discharge.   Neck: Normal range of motion. Neck supple.   Cardiovascular: Normal rate, regular rhythm and normal heart sounds.    Pulmonary/Chest: Effort normal and breath sounds normal. No respiratory distress. She has no wheezes.   Musculoskeletal: Normal range of motion.   Lymphadenopathy:     She has no cervical adenopathy.   Neurological: She is alert and oriented to person, place, and time.   Skin: Skin is warm and dry. She is not diaphoretic.   Psychiatric: She has a normal mood and affect. Her behavior is normal. Judgment and thought content normal.   Nursing note and vitals reviewed.              Assessment/Plan:     1. Right acute serous otitis " media, recurrence not specified  azithromycin (ZITHROMAX) 250 MG Tab    fluticasone (FLONASE) 50 MCG/ACT nasal spray     OTC meds and conservative measures as discussed    Return to clinic or go to ED if symptoms worsen or persist. Indications for ED discussed at length. Patient voices understanding. Follow-up with your primary care provider in 3-5 days. Red flags discussed. All side effects of medication discussed including allergic response, GI upset, tendon injury, etc.    Please note that this dictation was created using voice recognition software. I have made every reasonable attempt to correct obvious errors, but I expect that there are errors of grammar and possibly content that I did not discover before finalizing the note.

## 2019-06-29 ENCOUNTER — HOSPITAL ENCOUNTER (OUTPATIENT)
Dept: LAB | Facility: MEDICAL CENTER | Age: 59
End: 2019-06-29
Attending: NURSE PRACTITIONER
Payer: COMMERCIAL

## 2019-06-29 ENCOUNTER — HOSPITAL ENCOUNTER (OUTPATIENT)
Dept: LAB | Facility: MEDICAL CENTER | Age: 59
End: 2019-06-29
Payer: COMMERCIAL

## 2019-06-29 LAB
25(OH)D3 SERPL-MCNC: 13 NG/ML (ref 30–100)
ALBUMIN SERPL BCP-MCNC: 4.5 G/DL (ref 3.2–4.9)
ALBUMIN/GLOB SERPL: 1.3 G/DL
ALP SERPL-CCNC: 100 U/L (ref 30–99)
ALT SERPL-CCNC: 18 U/L (ref 2–50)
ANION GAP SERPL CALC-SCNC: 10 MMOL/L (ref 0–11.9)
AST SERPL-CCNC: 14 U/L (ref 12–45)
BASOPHILS # BLD AUTO: 1 % (ref 0–1.8)
BASOPHILS # BLD: 0.14 K/UL (ref 0–0.12)
BDY FAT % MEASURED: 32.2 %
BILIRUB SERPL-MCNC: 0.4 MG/DL (ref 0.1–1.5)
BP DIAS: 73 MMHG
BP SYS: 157 MMHG
BUN SERPL-MCNC: 23 MG/DL (ref 8–22)
CALCIUM SERPL-MCNC: 9.8 MG/DL (ref 8.5–10.5)
CHLORIDE SERPL-SCNC: 103 MMOL/L (ref 96–112)
CHOLEST SERPL-MCNC: 216 MG/DL (ref 100–199)
CHOLEST SERPL-MCNC: 221 MG/DL (ref 100–199)
CO2 SERPL-SCNC: 26 MMOL/L (ref 20–33)
CREAT SERPL-MCNC: 0.72 MG/DL (ref 0.5–1.4)
DIABETES HTDIA: NO
EOSINOPHIL # BLD AUTO: 0.01 K/UL (ref 0–0.51)
EOSINOPHIL NFR BLD: 0.1 % (ref 0–6.9)
ERYTHROCYTE [DISTWIDTH] IN BLOOD BY AUTOMATED COUNT: 47.1 FL (ref 35.9–50)
EST. AVERAGE GLUCOSE BLD GHB EST-MCNC: 143 MG/DL
EVENT NAME HTEVT: NORMAL
FASTING HTFAS: YES
FASTING STATUS PATIENT QL REPORTED: NORMAL
GLOBULIN SER CALC-MCNC: 3.4 G/DL (ref 1.9–3.5)
GLUCOSE SERPL-MCNC: 139 MG/DL (ref 65–99)
GLUCOSE SERPL-MCNC: 142 MG/DL (ref 65–99)
HBA1C MFR BLD: 6.6 % (ref 0–5.6)
HCT VFR BLD AUTO: 49.1 % (ref 37–47)
HDLC SERPL-MCNC: 37 MG/DL
HDLC SERPL-MCNC: 37 MG/DL
HGB BLD-MCNC: 15.5 G/DL (ref 12–16)
HYPERTENSION HTHYP: NO
IMM GRANULOCYTES # BLD AUTO: 0.12 K/UL (ref 0–0.11)
IMM GRANULOCYTES NFR BLD AUTO: 0.9 % (ref 0–0.9)
LDLC SERPL CALC-MCNC: 136 MG/DL
LDLC SERPL CALC-MCNC: 141 MG/DL
LYMPHOCYTES # BLD AUTO: 2.07 K/UL (ref 1–4.8)
LYMPHOCYTES NFR BLD: 14.9 % (ref 22–41)
MCH RBC QN AUTO: 28.2 PG (ref 27–33)
MCHC RBC AUTO-ENTMCNC: 31.6 G/DL (ref 33.6–35)
MCV RBC AUTO: 89.3 FL (ref 81.4–97.8)
MONOCYTES # BLD AUTO: 0.66 K/UL (ref 0–0.85)
MONOCYTES NFR BLD AUTO: 4.8 % (ref 0–13.4)
NEUTROPHILS # BLD AUTO: 10.85 K/UL (ref 2–7.15)
NEUTROPHILS NFR BLD: 78.3 % (ref 44–72)
NRBC # BLD AUTO: 0 K/UL
NRBC BLD-RTO: 0 /100 WBC
PLATELET # BLD AUTO: 465 K/UL (ref 164–446)
PMV BLD AUTO: 9.8 FL (ref 9–12.9)
POTASSIUM SERPL-SCNC: 4.1 MMOL/L (ref 3.6–5.5)
PROT SERPL-MCNC: 7.9 G/DL (ref 6–8.2)
RBC # BLD AUTO: 5.5 M/UL (ref 4.2–5.4)
SCREENING LOC CITY HTCIT: NORMAL
SCREENING LOC STATE HTSTA: NORMAL
SCREENING LOCATION HTLOC: NORMAL
SMOKING HTSMO: NO
SODIUM SERPL-SCNC: 139 MMOL/L (ref 135–145)
SUBSCRIBER ID HTSID: NORMAL
TRIGL SERPL-MCNC: 214 MG/DL (ref 0–149)
TRIGL SERPL-MCNC: 215 MG/DL (ref 0–149)
TSH SERPL DL<=0.005 MIU/L-ACNC: 1.78 UIU/ML (ref 0.38–5.33)
WBC # BLD AUTO: 13.9 K/UL (ref 4.8–10.8)

## 2019-06-29 PROCEDURE — 36415 COLL VENOUS BLD VENIPUNCTURE: CPT

## 2019-06-29 PROCEDURE — 80061 LIPID PANEL: CPT | Mod: 91

## 2019-06-29 PROCEDURE — 80053 COMPREHEN METABOLIC PANEL: CPT

## 2019-06-29 PROCEDURE — 84443 ASSAY THYROID STIM HORMONE: CPT

## 2019-06-29 PROCEDURE — 82947 ASSAY GLUCOSE BLOOD QUANT: CPT

## 2019-06-29 PROCEDURE — 80061 LIPID PANEL: CPT

## 2019-06-29 PROCEDURE — 84439 ASSAY OF FREE THYROXINE: CPT

## 2019-06-29 PROCEDURE — 83036 HEMOGLOBIN GLYCOSYLATED A1C: CPT

## 2019-06-29 PROCEDURE — 82306 VITAMIN D 25 HYDROXY: CPT

## 2019-06-29 PROCEDURE — S5190 WELLNESS ASSESSMENT BY NONPH: HCPCS

## 2019-06-29 PROCEDURE — 85025 COMPLETE CBC W/AUTO DIFF WBC: CPT

## 2019-07-03 LAB — T4 FREE SERPL DIALY-MCNC: 1.6 NG/DL (ref 1.1–2.4)

## 2019-07-09 ENCOUNTER — HOSPITAL ENCOUNTER (OUTPATIENT)
Dept: RADIOLOGY | Facility: MEDICAL CENTER | Age: 59
End: 2019-07-09
Attending: NURSE PRACTITIONER
Payer: COMMERCIAL

## 2019-07-09 DIAGNOSIS — Z12.31 VISIT FOR SCREENING MAMMOGRAM: ICD-10-CM

## 2019-07-09 PROCEDURE — 77063 BREAST TOMOSYNTHESIS BI: CPT

## 2019-07-10 NOTE — OP THERAPY DAILY TREATMENT
Outpatient Physical Therapy  DAILY TREATMENT     [facility] Physical [address]  [address]  [place] [address]  Phone:  [phone]  Fax:  [phone]    Date: 04/03/2018    Patient: Tammy Ontiveros  YOB: 1960  MRN: [id number]     Time Calculation  Start time: 1200  Stop time: 1250 Time Calculation (min): 50 minutes     Chief Complaint: Back Pain and Shoulder Pain    Visit #: 6    SUBJECTIVE:  Pt reports slight decrease in sxs both neck and back, also notices decrease in swelling in multiple locations.    OBJECTIVE:    Therapeutic Exercises (CPT 04409):     1. Nu Step, x8 min    2. Foam Roller, pec stretch, alt UE OH and lat, angels    3. Prone alt UE/LE over ball, x10 ea    4. Kneeling trunk rot w/UE on ball, x10 ea    5. Wall ball squats, x20    Therapeutic Treatments and Modalities:     1. E Stim Unattended (CPT 67706), cer/upper t spine    Time-based treatments/modalities:  Therapeutic exercise minutes (CPT 90440): 30 minutes      ASSESSMENT:   Pt demos slow antalgic movement but tolerated all exercises w/o increased c/o.    PLAN/RECOMMENDATIONS:   Plan for treatment: therapy treatment to continue next visit.  Progressing spinal mobility, scap and L/S stab exercises.       no

## 2019-09-24 ENCOUNTER — IMMUNIZATION (OUTPATIENT)
Dept: OCCUPATIONAL MEDICINE | Facility: CLINIC | Age: 59
End: 2019-09-24

## 2019-09-24 DIAGNOSIS — Z23 NEED FOR VACCINATION: ICD-10-CM

## 2019-09-26 PROCEDURE — 90686 IIV4 VACC NO PRSV 0.5 ML IM: CPT | Performed by: PREVENTIVE MEDICINE

## 2019-10-04 ENCOUNTER — EH NON-PROVIDER (OUTPATIENT)
Dept: OCCUPATIONAL MEDICINE | Facility: CLINIC | Age: 59
End: 2019-10-04

## 2019-10-04 DIAGNOSIS — Z02.89 ENCOUNTER FOR OCCUPATIONAL HEALTH EXAMINATION INVOLVING RESPIRATOR: Primary | ICD-10-CM

## 2019-10-04 PROCEDURE — 94375 RESPIRATORY FLOW VOLUME LOOP: CPT | Performed by: NURSE PRACTITIONER

## 2019-10-11 ENCOUNTER — HOSPITAL ENCOUNTER (OUTPATIENT)
Dept: RADIOLOGY | Facility: MEDICAL CENTER | Age: 59
End: 2019-10-11
Attending: PHYSICIAN ASSISTANT
Payer: COMMERCIAL

## 2019-10-11 DIAGNOSIS — R10.9 STOMACH ACHE: ICD-10-CM

## 2019-10-11 PROCEDURE — 76700 US EXAM ABDOM COMPLETE: CPT

## 2019-10-26 ENCOUNTER — HOSPITAL ENCOUNTER (OUTPATIENT)
Dept: LAB | Facility: MEDICAL CENTER | Age: 59
End: 2019-10-26
Attending: PHYSICIAN ASSISTANT
Payer: COMMERCIAL

## 2019-10-26 LAB
25(OH)D3 SERPL-MCNC: 26 NG/ML (ref 30–100)
ALBUMIN SERPL BCP-MCNC: 4 G/DL (ref 3.2–4.9)
ALBUMIN/GLOB SERPL: 1.3 G/DL
ALP SERPL-CCNC: 102 U/L (ref 30–99)
ALT SERPL-CCNC: 20 U/L (ref 2–50)
ANION GAP SERPL CALC-SCNC: 9 MMOL/L (ref 0–11.9)
AST SERPL-CCNC: 20 U/L (ref 12–45)
BASOPHILS # BLD AUTO: 1.4 % (ref 0–1.8)
BASOPHILS # BLD: 0.11 K/UL (ref 0–0.12)
BILIRUB SERPL-MCNC: 0.4 MG/DL (ref 0.1–1.5)
BUN SERPL-MCNC: 11 MG/DL (ref 8–22)
CALCIUM SERPL-MCNC: 9.3 MG/DL (ref 8.5–10.5)
CHLORIDE SERPL-SCNC: 104 MMOL/L (ref 96–112)
CHOLEST SERPL-MCNC: 176 MG/DL (ref 100–199)
CO2 SERPL-SCNC: 27 MMOL/L (ref 20–33)
CREAT SERPL-MCNC: 0.62 MG/DL (ref 0.5–1.4)
EOSINOPHIL # BLD AUTO: 0.26 K/UL (ref 0–0.51)
EOSINOPHIL NFR BLD: 3.4 % (ref 0–6.9)
ERYTHROCYTE [DISTWIDTH] IN BLOOD BY AUTOMATED COUNT: 44.5 FL (ref 35.9–50)
EST. AVERAGE GLUCOSE BLD GHB EST-MCNC: 151 MG/DL
FASTING STATUS PATIENT QL REPORTED: NORMAL
GLOBULIN SER CALC-MCNC: 3.2 G/DL (ref 1.9–3.5)
GLUCOSE SERPL-MCNC: 134 MG/DL (ref 65–99)
HBA1C MFR BLD: 6.9 % (ref 0–5.6)
HCT VFR BLD AUTO: 46 % (ref 37–47)
HDLC SERPL-MCNC: 31 MG/DL
HGB BLD-MCNC: 14.9 G/DL (ref 12–16)
IMM GRANULOCYTES # BLD AUTO: 0.02 K/UL (ref 0–0.11)
IMM GRANULOCYTES NFR BLD AUTO: 0.3 % (ref 0–0.9)
LDLC SERPL CALC-MCNC: 99 MG/DL
LYMPHOCYTES # BLD AUTO: 2.34 K/UL (ref 1–4.8)
LYMPHOCYTES NFR BLD: 30.4 % (ref 22–41)
MCH RBC QN AUTO: 28.7 PG (ref 27–33)
MCHC RBC AUTO-ENTMCNC: 32.4 G/DL (ref 33.6–35)
MCV RBC AUTO: 88.5 FL (ref 81.4–97.8)
MONOCYTES # BLD AUTO: 0.55 K/UL (ref 0–0.85)
MONOCYTES NFR BLD AUTO: 7.1 % (ref 0–13.4)
NEUTROPHILS # BLD AUTO: 4.42 K/UL (ref 2–7.15)
NEUTROPHILS NFR BLD: 57.4 % (ref 44–72)
NRBC # BLD AUTO: 0 K/UL
NRBC BLD-RTO: 0 /100 WBC
PLATELET # BLD AUTO: 365 K/UL (ref 164–446)
PMV BLD AUTO: 9.9 FL (ref 9–12.9)
POTASSIUM SERPL-SCNC: 4.1 MMOL/L (ref 3.6–5.5)
PROT SERPL-MCNC: 7.2 G/DL (ref 6–8.2)
RBC # BLD AUTO: 5.2 M/UL (ref 4.2–5.4)
SODIUM SERPL-SCNC: 140 MMOL/L (ref 135–145)
T4 FREE SERPL-MCNC: 0.78 NG/DL (ref 0.53–1.43)
TRIGL SERPL-MCNC: 228 MG/DL (ref 0–149)
TSH SERPL DL<=0.005 MIU/L-ACNC: 1.62 UIU/ML (ref 0.38–5.33)
WBC # BLD AUTO: 7.7 K/UL (ref 4.8–10.8)

## 2019-10-26 PROCEDURE — 82306 VITAMIN D 25 HYDROXY: CPT

## 2019-10-26 PROCEDURE — 83036 HEMOGLOBIN GLYCOSYLATED A1C: CPT

## 2019-10-26 PROCEDURE — 85025 COMPLETE CBC W/AUTO DIFF WBC: CPT

## 2019-10-26 PROCEDURE — 84439 ASSAY OF FREE THYROXINE: CPT

## 2019-10-26 PROCEDURE — 80053 COMPREHEN METABOLIC PANEL: CPT

## 2019-10-26 PROCEDURE — 36415 COLL VENOUS BLD VENIPUNCTURE: CPT

## 2019-10-26 PROCEDURE — 80061 LIPID PANEL: CPT

## 2019-10-26 PROCEDURE — 84443 ASSAY THYROID STIM HORMONE: CPT

## 2020-01-24 ENCOUNTER — HOSPITAL ENCOUNTER (OUTPATIENT)
Dept: LAB | Facility: MEDICAL CENTER | Age: 60
End: 2020-01-24
Attending: PHYSICIAN ASSISTANT
Payer: COMMERCIAL

## 2020-01-24 LAB
25(OH)D3 SERPL-MCNC: 23 NG/ML (ref 30–100)
ALBUMIN SERPL BCP-MCNC: 4.2 G/DL (ref 3.2–4.9)
ALBUMIN/GLOB SERPL: 1.2 G/DL
ALP SERPL-CCNC: 91 U/L (ref 30–99)
ALT SERPL-CCNC: 19 U/L (ref 2–50)
ANION GAP SERPL CALC-SCNC: 10 MMOL/L (ref 0–11.9)
AST SERPL-CCNC: 20 U/L (ref 12–45)
BILIRUB SERPL-MCNC: 0.4 MG/DL (ref 0.1–1.5)
BUN SERPL-MCNC: 11 MG/DL (ref 8–22)
CALCIUM SERPL-MCNC: 9.2 MG/DL (ref 8.5–10.5)
CHLORIDE SERPL-SCNC: 105 MMOL/L (ref 96–112)
CHOLEST SERPL-MCNC: 166 MG/DL (ref 100–199)
CO2 SERPL-SCNC: 26 MMOL/L (ref 20–33)
CREAT SERPL-MCNC: 0.64 MG/DL (ref 0.5–1.4)
EST. AVERAGE GLUCOSE BLD GHB EST-MCNC: 151 MG/DL
GLOBULIN SER CALC-MCNC: 3.4 G/DL (ref 1.9–3.5)
GLUCOSE SERPL-MCNC: 114 MG/DL (ref 65–99)
HBA1C MFR BLD: 6.9 % (ref 0–5.6)
HDLC SERPL-MCNC: 28 MG/DL
LDLC SERPL CALC-MCNC: 92 MG/DL
POTASSIUM SERPL-SCNC: 4.1 MMOL/L (ref 3.6–5.5)
PROT SERPL-MCNC: 7.6 G/DL (ref 6–8.2)
SODIUM SERPL-SCNC: 141 MMOL/L (ref 135–145)
TRIGL SERPL-MCNC: 230 MG/DL (ref 0–149)

## 2020-01-24 PROCEDURE — 36415 COLL VENOUS BLD VENIPUNCTURE: CPT

## 2020-01-24 PROCEDURE — 80053 COMPREHEN METABOLIC PANEL: CPT

## 2020-01-24 PROCEDURE — 80061 LIPID PANEL: CPT

## 2020-01-24 PROCEDURE — 82306 VITAMIN D 25 HYDROXY: CPT

## 2020-01-24 PROCEDURE — 83036 HEMOGLOBIN GLYCOSYLATED A1C: CPT

## 2020-09-22 ENCOUNTER — IMMUNIZATION (OUTPATIENT)
Dept: OCCUPATIONAL MEDICINE | Facility: CLINIC | Age: 60
End: 2020-09-22

## 2020-09-22 DIAGNOSIS — Z23 NEED FOR VACCINATION: ICD-10-CM

## 2020-09-22 PROCEDURE — 90686 IIV4 VACC NO PRSV 0.5 ML IM: CPT | Performed by: PREVENTIVE MEDICINE

## 2020-12-17 DIAGNOSIS — Z23 NEED FOR VACCINATION: ICD-10-CM

## 2021-05-06 ENCOUNTER — IMMUNIZATION (OUTPATIENT)
Dept: OTHER | Facility: MEDICAL CENTER | Age: 61
End: 2021-05-06

## 2021-05-06 DIAGNOSIS — Z23 ENCOUNTER FOR VACCINATION: Primary | ICD-10-CM

## 2021-05-06 PROCEDURE — 91301 MODERNA SARS-COV-2 VACCINE: CPT

## 2021-05-06 PROCEDURE — 0011A MODERNA SARS-COV-2 VACCINE: CPT

## 2021-06-17 ENCOUNTER — HOSPITAL ENCOUNTER (OUTPATIENT)
Dept: RADIOLOGY | Facility: MEDICAL CENTER | Age: 61
End: 2021-06-17
Attending: STUDENT IN AN ORGANIZED HEALTH CARE EDUCATION/TRAINING PROGRAM
Payer: COMMERCIAL

## 2021-06-17 DIAGNOSIS — Z12.31 ENCOUNTER FOR MAMMOGRAM TO ESTABLISH BASELINE MAMMOGRAM: ICD-10-CM

## 2021-06-17 PROCEDURE — 77063 BREAST TOMOSYNTHESIS BI: CPT

## 2021-06-18 ENCOUNTER — HOSPITAL ENCOUNTER (OUTPATIENT)
Dept: LAB | Facility: MEDICAL CENTER | Age: 61
End: 2021-06-18
Attending: STUDENT IN AN ORGANIZED HEALTH CARE EDUCATION/TRAINING PROGRAM
Payer: COMMERCIAL

## 2021-06-18 LAB
25(OH)D3 SERPL-MCNC: 25 NG/ML (ref 30–100)
ALBUMIN SERPL BCP-MCNC: 4 G/DL (ref 3.2–4.9)
ALBUMIN/GLOB SERPL: 1.3 G/DL
ALP SERPL-CCNC: 155 U/L (ref 30–99)
ALT SERPL-CCNC: 27 U/L (ref 2–50)
ANION GAP SERPL CALC-SCNC: 11 MMOL/L (ref 7–16)
AST SERPL-CCNC: 24 U/L (ref 12–45)
BASOPHILS # BLD AUTO: 1.3 % (ref 0–1.8)
BASOPHILS # BLD: 0.09 K/UL (ref 0–0.12)
BILIRUB SERPL-MCNC: 0.4 MG/DL (ref 0.1–1.5)
BUN SERPL-MCNC: 10 MG/DL (ref 8–22)
CALCIUM SERPL-MCNC: 8.9 MG/DL (ref 8.5–10.5)
CHLORIDE SERPL-SCNC: 101 MMOL/L (ref 96–112)
CHOLEST SERPL-MCNC: 182 MG/DL (ref 100–199)
CO2 SERPL-SCNC: 25 MMOL/L (ref 20–33)
CREAT SERPL-MCNC: 0.6 MG/DL (ref 0.5–1.4)
CREAT UR-MCNC: 107.74 MG/DL
CREAT UR-MCNC: 108.81 MG/DL
EOSINOPHIL # BLD AUTO: 0.22 K/UL (ref 0–0.51)
EOSINOPHIL NFR BLD: 3.2 % (ref 0–6.9)
ERYTHROCYTE [DISTWIDTH] IN BLOOD BY AUTOMATED COUNT: 41.8 FL (ref 35.9–50)
EST. AVERAGE GLUCOSE BLD GHB EST-MCNC: 301 MG/DL
GLOBULIN SER CALC-MCNC: 3.1 G/DL (ref 1.9–3.5)
GLUCOSE SERPL-MCNC: 321 MG/DL (ref 65–99)
HBA1C MFR BLD: 12.1 % (ref 4–5.6)
HCT VFR BLD AUTO: 46.7 % (ref 37–47)
HDLC SERPL-MCNC: 23 MG/DL
HGB BLD-MCNC: 16 G/DL (ref 12–16)
IMM GRANULOCYTES # BLD AUTO: 0.02 K/UL (ref 0–0.11)
IMM GRANULOCYTES NFR BLD AUTO: 0.3 % (ref 0–0.9)
LDLC SERPL CALC-MCNC: ABNORMAL MG/DL
LYMPHOCYTES # BLD AUTO: 2.14 K/UL (ref 1–4.8)
LYMPHOCYTES NFR BLD: 30.9 % (ref 22–41)
MCH RBC QN AUTO: 29.6 PG (ref 27–33)
MCHC RBC AUTO-ENTMCNC: 34.3 G/DL (ref 33.6–35)
MCV RBC AUTO: 86.3 FL (ref 81.4–97.8)
MICROALBUMIN UR-MCNC: 2 MG/DL
MICROALBUMIN/CREAT UR: 19 MG/G (ref 0–30)
MONOCYTES # BLD AUTO: 0.41 K/UL (ref 0–0.85)
MONOCYTES NFR BLD AUTO: 5.9 % (ref 0–13.4)
NEUTROPHILS # BLD AUTO: 4.04 K/UL (ref 2–7.15)
NEUTROPHILS NFR BLD: 58.4 % (ref 44–72)
NRBC # BLD AUTO: 0 K/UL
NRBC BLD-RTO: 0 /100 WBC
PLATELET # BLD AUTO: 351 K/UL (ref 164–446)
PMV BLD AUTO: 10.3 FL (ref 9–12.9)
POTASSIUM SERPL-SCNC: 4.5 MMOL/L (ref 3.6–5.5)
PROT SERPL-MCNC: 7.1 G/DL (ref 6–8.2)
RBC # BLD AUTO: 5.41 M/UL (ref 4.2–5.4)
SODIUM SERPL-SCNC: 137 MMOL/L (ref 135–145)
TRIGL SERPL-MCNC: 568 MG/DL (ref 0–149)
TSH SERPL DL<=0.005 MIU/L-ACNC: 1.55 UIU/ML (ref 0.38–5.33)
WBC # BLD AUTO: 6.9 K/UL (ref 4.8–10.8)

## 2021-06-18 PROCEDURE — 36415 COLL VENOUS BLD VENIPUNCTURE: CPT

## 2021-06-18 PROCEDURE — 84443 ASSAY THYROID STIM HORMONE: CPT

## 2021-06-18 PROCEDURE — 80053 COMPREHEN METABOLIC PANEL: CPT

## 2021-06-18 PROCEDURE — 80061 LIPID PANEL: CPT

## 2021-06-18 PROCEDURE — 82570 ASSAY OF URINE CREATININE: CPT

## 2021-06-18 PROCEDURE — 82306 VITAMIN D 25 HYDROXY: CPT

## 2021-06-18 PROCEDURE — 85025 COMPLETE CBC W/AUTO DIFF WBC: CPT

## 2021-06-18 PROCEDURE — 83036 HEMOGLOBIN GLYCOSYLATED A1C: CPT

## 2021-06-18 PROCEDURE — 82043 UR ALBUMIN QUANTITATIVE: CPT

## 2021-09-21 ENCOUNTER — IMMUNIZATION (OUTPATIENT)
Dept: OCCUPATIONAL MEDICINE | Facility: CLINIC | Age: 61
End: 2021-09-21

## 2021-09-21 DIAGNOSIS — Z23 NEED FOR VACCINATION: Primary | ICD-10-CM

## 2021-09-22 PROCEDURE — 90686 IIV4 VACC NO PRSV 0.5 ML IM: CPT | Performed by: PREVENTIVE MEDICINE

## 2021-09-29 ENCOUNTER — HOSPITAL ENCOUNTER (OUTPATIENT)
Dept: LAB | Facility: MEDICAL CENTER | Age: 61
End: 2021-09-29
Attending: STUDENT IN AN ORGANIZED HEALTH CARE EDUCATION/TRAINING PROGRAM
Payer: COMMERCIAL

## 2021-09-29 LAB
ALBUMIN SERPL BCP-MCNC: 4.2 G/DL (ref 3.2–4.9)
ALBUMIN/GLOB SERPL: 1.4 G/DL
ALP SERPL-CCNC: 152 U/L (ref 30–99)
ALT SERPL-CCNC: 23 U/L (ref 2–50)
ANION GAP SERPL CALC-SCNC: 11 MMOL/L (ref 7–16)
AST SERPL-CCNC: 16 U/L (ref 12–45)
BILIRUB SERPL-MCNC: 0.4 MG/DL (ref 0.1–1.5)
BUN SERPL-MCNC: 11 MG/DL (ref 8–22)
CALCIUM SERPL-MCNC: 9.1 MG/DL (ref 8.5–10.5)
CHLORIDE SERPL-SCNC: 98 MMOL/L (ref 96–112)
CHOLEST SERPL-MCNC: 204 MG/DL (ref 100–199)
CO2 SERPL-SCNC: 27 MMOL/L (ref 20–33)
CREAT SERPL-MCNC: 0.61 MG/DL (ref 0.5–1.4)
EST. AVERAGE GLUCOSE BLD GHB EST-MCNC: 306 MG/DL
FASTING STATUS PATIENT QL REPORTED: NORMAL
GLOBULIN SER CALC-MCNC: 3 G/DL (ref 1.9–3.5)
GLUCOSE SERPL-MCNC: 320 MG/DL (ref 65–99)
HBA1C MFR BLD: 12.3 % (ref 4–5.6)
HDLC SERPL-MCNC: 28 MG/DL
LDLC SERPL CALC-MCNC: ABNORMAL MG/DL
POTASSIUM SERPL-SCNC: 4.2 MMOL/L (ref 3.6–5.5)
PROT SERPL-MCNC: 7.2 G/DL (ref 6–8.2)
SODIUM SERPL-SCNC: 136 MMOL/L (ref 135–145)
TRIGL SERPL-MCNC: 503 MG/DL (ref 0–149)

## 2021-09-29 PROCEDURE — 80053 COMPREHEN METABOLIC PANEL: CPT

## 2021-09-29 PROCEDURE — 83036 HEMOGLOBIN GLYCOSYLATED A1C: CPT

## 2021-09-29 PROCEDURE — 36415 COLL VENOUS BLD VENIPUNCTURE: CPT

## 2021-09-29 PROCEDURE — 80061 LIPID PANEL: CPT

## 2021-10-23 ENCOUNTER — APPOINTMENT (OUTPATIENT)
Dept: RADIOLOGY | Facility: MEDICAL CENTER | Age: 61
End: 2021-10-23
Attending: FAMILY MEDICINE
Payer: COMMERCIAL

## 2021-10-23 ENCOUNTER — HOSPITAL ENCOUNTER (EMERGENCY)
Facility: MEDICAL CENTER | Age: 61
End: 2021-11-06
Attending: FAMILY MEDICINE
Payer: COMMERCIAL

## 2021-10-23 DIAGNOSIS — K76.89 COMPENSATORY LOBAR HYPERPLASIA OF LIVER: ICD-10-CM

## 2021-10-23 DIAGNOSIS — K76.0 FATTY METAMORPHOSIS OF LIVER: ICD-10-CM

## 2021-10-23 PROCEDURE — 76700 US EXAM ABDOM COMPLETE: CPT
